# Patient Record
Sex: MALE | Race: WHITE | Employment: OTHER | URBAN - METROPOLITAN AREA
[De-identification: names, ages, dates, MRNs, and addresses within clinical notes are randomized per-mention and may not be internally consistent; named-entity substitution may affect disease eponyms.]

---

## 2021-04-26 ENCOUNTER — TRANSCRIBE ORDER (OUTPATIENT)
Dept: SCHEDULING | Age: 71
End: 2021-04-26

## 2021-04-26 DIAGNOSIS — Z72.0 TOBACCO ABUSE: Primary | ICD-10-CM

## 2021-08-02 ENCOUNTER — HOSPITAL ENCOUNTER (OUTPATIENT)
Age: 71
Setting detail: OBSERVATION
Discharge: HOME | End: 2021-08-03
Attending: INTERNAL MEDICINE | Admitting: INTERNAL MEDICINE
Payer: MEDICARE

## 2021-08-02 ENCOUNTER — APPOINTMENT (OUTPATIENT)
Dept: VASCULAR SURGERY | Age: 71
End: 2021-08-02
Attending: NURSE PRACTITIONER
Payer: MEDICARE

## 2021-08-02 ENCOUNTER — APPOINTMENT (OUTPATIENT)
Dept: GENERAL RADIOLOGY | Age: 71
End: 2021-08-02
Attending: INTERNAL MEDICINE
Payer: MEDICARE

## 2021-08-02 ENCOUNTER — APPOINTMENT (OUTPATIENT)
Dept: MRI IMAGING | Age: 71
End: 2021-08-02
Attending: NURSE PRACTITIONER
Payer: MEDICARE

## 2021-08-02 ENCOUNTER — APPOINTMENT (OUTPATIENT)
Dept: CT IMAGING | Age: 71
End: 2021-08-02
Attending: INTERNAL MEDICINE
Payer: MEDICARE

## 2021-08-02 DIAGNOSIS — R55 SYNCOPE AND COLLAPSE: ICD-10-CM

## 2021-08-02 DIAGNOSIS — S82.831A CLOSED FRACTURE OF DISTAL END OF RIGHT FIBULA, UNSPECIFIED FRACTURE MORPHOLOGY, INITIAL ENCOUNTER: Primary | ICD-10-CM

## 2021-08-02 DIAGNOSIS — I45.10 RIGHT BUNDLE BRANCH BLOCK: ICD-10-CM

## 2021-08-02 PROBLEM — S82.409A FIBULA FRACTURE: Status: ACTIVE | Noted: 2021-08-02

## 2021-08-02 PROBLEM — I10 HYPERTENSION: Status: ACTIVE | Noted: 2021-08-02

## 2021-08-02 PROBLEM — Z86.73 HISTORY OF CVA (CEREBROVASCULAR ACCIDENT): Status: ACTIVE | Noted: 2021-08-02

## 2021-08-02 LAB
ANION GAP SERPL CALC-SCNC: 10 MMOL/L
ATRIAL RATE: 67 BPM
BASOPHILS # BLD: 0 K/UL (ref 0–0.1)
BASOPHILS NFR BLD: 0 % (ref 0–2)
BUN SERPL-MCNC: 10 MG/DL (ref 9–21)
BUN/CREAT SERPL: 14
CA-I BLD-MCNC: 9.2 MG/DL (ref 8.5–10.5)
CALCULATED P AXIS, ECG09: 48 DEGREES
CALCULATED R AXIS, ECG10: 28 DEGREES
CALCULATED T AXIS, ECG11: 21 DEGREES
CHLORIDE SERPL-SCNC: 99 MMOL/L (ref 94–111)
CO2 SERPL-SCNC: 25 MMOL/L (ref 21–33)
CREAT SERPL-MCNC: 0.7 MG/DL (ref 0.8–1.5)
DIAGNOSIS, 93000: NORMAL
EOSINOPHIL # BLD: 0.2 K/UL (ref 0–0.4)
EOSINOPHIL NFR BLD: 2 % (ref 0–5)
ERYTHROCYTE [DISTWIDTH] IN BLOOD BY AUTOMATED COUNT: 14.1 % (ref 11.6–14.5)
GLUCOSE SERPL-MCNC: 110 MG/DL (ref 70–110)
HCT VFR BLD AUTO: 38 % (ref 36–48)
HGB BLD-MCNC: 12.9 G/DL (ref 13–16)
IMM GRANULOCYTES # BLD AUTO: 0 K/UL
IMM GRANULOCYTES NFR BLD AUTO: 0 %
LEFT CCA DIST DIAS: 16.8 CM/S
LEFT CCA DIST SYS: 90.7 CM/S
LEFT CCA MID DIAS: 13 CM/S
LEFT CCA MID SYS: 100 CM/S
LEFT CCA PROX DIAS: 16.1 CM/S
LEFT CCA PROX SYS: 125 CM/S
LEFT ECA SYS: 79.3 CM/S
LEFT ICA DIST DIAS: 32.9 CM/S
LEFT ICA DIST SYS: 123 CM/S
LEFT ICA MID DIAS: 26.1 CM/S
LEFT ICA MID SYS: 114 CM/S
LEFT ICA PROX DIAS: 18.6 CM/S
LEFT ICA PROX SYS: 87 CM/S
LEFT ICA/CCA SYS: 1.35
LEFT SUBCLAVIAN SYS: 139 CM/S
LEFT VERTEBRAL DIAS: 22.6 CM/S
LEFT VERTEBRAL SYS: 82.8 CM/S
LYMPHOCYTES # BLD: 1.7 K/UL (ref 0.9–3.6)
LYMPHOCYTES NFR BLD: 22 % (ref 21–52)
MAGNESIUM SERPL-MCNC: 1.9 MG/DL (ref 1.7–2.8)
MCH RBC QN AUTO: 31.5 PG (ref 24–34)
MCHC RBC AUTO-ENTMCNC: 33.9 G/DL (ref 31–37)
MCV RBC AUTO: 92.7 FL (ref 74–97)
MONOCYTES # BLD: 1 K/UL (ref 0.05–1.2)
MONOCYTES NFR BLD: 12 % (ref 3–10)
NEUTS SEG # BLD: 5 K/UL (ref 1.8–8)
NEUTS SEG NFR BLD: 64 % (ref 40–73)
P-R INTERVAL, ECG05: 195 MS
PLATELET # BLD AUTO: 189 K/UL (ref 135–420)
PMV BLD AUTO: 11 FL (ref 9.2–11.8)
POTASSIUM SERPL-SCNC: 3.5 MMOL/L (ref 3.2–5.1)
Q-T INTERVAL, ECG07: 414 MS
QRS DURATION, ECG06: 133 MS
QTC CALCULATION (BEZET), ECG08: 434 MS
RBC # BLD AUTO: 4.1 M/UL (ref 4.7–5.5)
RIGHT CCA DIST DIAS: 15.4 CM/S
RIGHT CCA DIST SYS: 93.9 CM/S
RIGHT CCA MID DIAS: 18.8 CM/S
RIGHT CCA MID SYS: 135 CM/S
RIGHT CCA PROX DIAS: 14.9 CM/S
RIGHT CCA PROX SYS: 136 CM/S
RIGHT ECA SYS: 103 CM/S
RIGHT ICA DIST DIAS: 30.8 CM/S
RIGHT ICA DIST SYS: 113 CM/S
RIGHT ICA MID DIAS: 31.5 CM/S
RIGHT ICA MID SYS: 120 CM/S
RIGHT ICA PROX DIAS: 13.3 CM/S
RIGHT ICA PROX SYS: 63.8 CM/S
RIGHT ICA/CCA SYS: 1.28
RIGHT SUBCLAVIAN SYS: 122 CM/S
RIGHT VERTEBRAL DIAS: 11.9 CM/S
RIGHT VERTEBRAL SYS: 60.9 CM/S
SODIUM SERPL-SCNC: 134 MMOL/L (ref 135–145)
VENTRICULAR RATE, ECG03: 66 BPM
WBC # BLD AUTO: 7.9 K/UL (ref 4.6–13.2)

## 2021-08-02 PROCEDURE — 93880 EXTRACRANIAL BILAT STUDY: CPT

## 2021-08-02 PROCEDURE — 99283 EMERGENCY DEPT VISIT LOW MDM: CPT

## 2021-08-02 PROCEDURE — 83735 ASSAY OF MAGNESIUM: CPT

## 2021-08-02 PROCEDURE — 71046 X-RAY EXAM CHEST 2 VIEWS: CPT

## 2021-08-02 PROCEDURE — 99218 HC RM OBSERVATION: CPT

## 2021-08-02 PROCEDURE — 74011250637 HC RX REV CODE- 250/637: Performed by: NURSE PRACTITIONER

## 2021-08-02 PROCEDURE — 74011636320 HC RX REV CODE- 636/320: Performed by: INTERNAL MEDICINE

## 2021-08-02 PROCEDURE — A9576 INJ PROHANCE MULTIPACK: HCPCS | Performed by: INTERNAL MEDICINE

## 2021-08-02 PROCEDURE — 93005 ELECTROCARDIOGRAM TRACING: CPT

## 2021-08-02 PROCEDURE — 97166 OT EVAL MOD COMPLEX 45 MIN: CPT

## 2021-08-02 PROCEDURE — 70450 CT HEAD/BRAIN W/O DYE: CPT

## 2021-08-02 PROCEDURE — 80048 BASIC METABOLIC PNL TOTAL CA: CPT

## 2021-08-02 PROCEDURE — 74011250636 HC RX REV CODE- 250/636: Performed by: NURSE PRACTITIONER

## 2021-08-02 PROCEDURE — 85025 COMPLETE CBC W/AUTO DIFF WBC: CPT

## 2021-08-02 PROCEDURE — 73610 X-RAY EXAM OF ANKLE: CPT

## 2021-08-02 PROCEDURE — 70553 MRI BRAIN STEM W/O & W/DYE: CPT

## 2021-08-02 PROCEDURE — 70549 MR ANGIOGRAPH NECK W/O&W/DYE: CPT

## 2021-08-02 RX ORDER — ENOXAPARIN SODIUM 100 MG/ML
40 INJECTION SUBCUTANEOUS EVERY 24 HOURS
Status: DISCONTINUED | OUTPATIENT
Start: 2021-08-02 | End: 2021-08-03 | Stop reason: HOSPADM

## 2021-08-02 RX ORDER — LABETALOL HCL 20 MG/4 ML
5 SYRINGE (ML) INTRAVENOUS
Status: DISCONTINUED | OUTPATIENT
Start: 2021-08-02 | End: 2021-08-03 | Stop reason: HOSPADM

## 2021-08-02 RX ORDER — SODIUM CHLORIDE 9 MG/ML
100 INJECTION, SOLUTION INTRAVENOUS CONTINUOUS
Status: DISPENSED | OUTPATIENT
Start: 2021-08-02 | End: 2021-08-03

## 2021-08-02 RX ORDER — ONDANSETRON 2 MG/ML
4 INJECTION INTRAMUSCULAR; INTRAVENOUS
Status: DISCONTINUED | OUTPATIENT
Start: 2021-08-02 | End: 2021-08-03 | Stop reason: HOSPADM

## 2021-08-02 RX ORDER — ASPIRIN 81 MG/1
81 TABLET ORAL DAILY
COMMUNITY
End: 2021-08-03

## 2021-08-02 RX ORDER — ATORVASTATIN CALCIUM 40 MG/1
80 TABLET, FILM COATED ORAL
Status: DISCONTINUED | OUTPATIENT
Start: 2021-08-02 | End: 2021-08-03 | Stop reason: HOSPADM

## 2021-08-02 RX ORDER — ASPIRIN 325 MG
325 TABLET ORAL DAILY
Status: DISCONTINUED | OUTPATIENT
Start: 2021-08-03 | End: 2021-08-03 | Stop reason: HOSPADM

## 2021-08-02 RX ORDER — HYDROCHLOROTHIAZIDE 25 MG/1
25 TABLET ORAL DAILY
Status: DISCONTINUED | OUTPATIENT
Start: 2021-08-03 | End: 2021-08-03 | Stop reason: HOSPADM

## 2021-08-02 RX ORDER — ATORVASTATIN CALCIUM 10 MG/1
TABLET, FILM COATED ORAL DAILY
Status: ON HOLD | COMMUNITY
End: 2021-08-02

## 2021-08-02 RX ORDER — ACETAMINOPHEN 325 MG/1
650 TABLET ORAL
Status: DISCONTINUED | OUTPATIENT
Start: 2021-08-02 | End: 2021-08-03 | Stop reason: HOSPADM

## 2021-08-02 RX ORDER — LISINOPRIL 10 MG/1
10 TABLET ORAL DAILY
COMMUNITY

## 2021-08-02 RX ORDER — LISINOPRIL 5 MG/1
10 TABLET ORAL DAILY
Status: DISCONTINUED | OUTPATIENT
Start: 2021-08-03 | End: 2021-08-03 | Stop reason: HOSPADM

## 2021-08-02 RX ORDER — HYDROCHLOROTHIAZIDE 25 MG/1
25 TABLET ORAL DAILY
COMMUNITY

## 2021-08-02 RX ORDER — HYDROCODONE BITARTRATE AND ACETAMINOPHEN 5; 325 MG/1; MG/1
1 TABLET ORAL
Status: DISCONTINUED | OUTPATIENT
Start: 2021-08-02 | End: 2021-08-03 | Stop reason: HOSPADM

## 2021-08-02 RX ADMIN — ATORVASTATIN CALCIUM 80 MG: 40 TABLET, FILM COATED ORAL at 21:26

## 2021-08-02 RX ADMIN — SODIUM CHLORIDE 100 ML/HR: 9 INJECTION, SOLUTION INTRAVENOUS at 21:46

## 2021-08-02 RX ADMIN — GADOTERIDOL 18 ML: 279.3 INJECTION, SOLUTION INTRAVENOUS at 15:20

## 2021-08-02 RX ADMIN — HYDROCODONE BITARTRATE AND ACETAMINOPHEN 1 TABLET: 5; 325 TABLET ORAL at 16:16

## 2021-08-02 NOTE — PROGRESS NOTES
TRANSFER - OUT REPORT:    Verbal report given to Martha RN(name) on Srikanth  being transferred to (unit) for routine progression of care       Report consisted of patients Situation, Background, Assessment and   Recommendations(SBAR). Information from the following report(s) SBAR and ED Summary was reviewed with the receiving nurse. Lines:       Opportunity for questions and clarification was provided.       Patient transported with:   Monitor  Registered Nurse

## 2021-08-02 NOTE — ASSESSMENT & PLAN NOTE
-non- weight bearing  -status post syncope and collapse  -Orthopedic consulted: Per Manny Oconnor, At that point even will repeat x-rays of the right ankle if there is no talar shift patient will be converted to a cam boot. Will remain nonweightbearing 6 weeks from the time of the injury at that point we will repeat x-rays again and if the x-rays do not show any shift then we will resume progressive weightbearing of 25% each week with the cam boot.   -PT/OT consulted  - vs SNF

## 2021-08-02 NOTE — PROGRESS NOTES
Received care of patient from emergency room no distress noted skin warm and dry no complaints of pain or discomfort voiced. Patient has a pack of cigarettes in shirt pocket and stated \"he isn't giving them up but is aware that he can't smoke in the hospital\" charge nurse made aware.

## 2021-08-02 NOTE — PROGRESS NOTES
Problem: Tobacco Use  Goal: *Tobacco use abstinence  Outcome: Progressing Towards Goal  Goal: *Knowledge of tobacco use cessation methods  Outcome: Progressing Towards Goal     Problem: Patient Education: Go to Patient Education Activity  Goal: Patient/Family Education  Outcome: Progressing Towards Goal     Problem: Falls - Risk of  Goal: *Absence of Falls  Description: Document Yamel Gamez Fall Risk and appropriate interventions in the flowsheet.   Outcome: Progressing Towards Goal  Note: Fall Risk Interventions:                                Problem: Patient Education: Go to Patient Education Activity  Goal: Patient/Family Education  Outcome: Progressing Towards Goal

## 2021-08-02 NOTE — ED PROVIDER NOTES
EMERGENCY DEPARTMENT HISTORY AND PHYSICAL EXAM      Date: 8/2/2021  Patient Name: Nessa Schrader    History of Presenting Illness     Chief Complaint   Patient presents with    Ankle Injury       History Provided By: Patient    HPI: Nessa Schrader, 78 y.o. male with a past medical history significant for stroke and HTN that presents to the ED with cc of right ankle injury. Pt states that he was sitting in the back of his truck yesterday and became dehydrated and had a syncopal episode. Wife states that the patient slumped and  fell out of the truck; foaming at the mouth and unconscious for several minutes; And he injured his right ankle. He states that he has had brief syncopal episodes since his stroke left him with right sided weakness and he occasionally gets spasms of his right leg. He denies chest pain ; sob; headache; head injury    There are no other complaints, changes, or physical findings at this time. PCP: Unknown, Provider, MD    No current facility-administered medications on file prior to encounter. Current Outpatient Medications on File Prior to Encounter   Medication Sig Dispense Refill    hydroCHLOROthiazide (MICROZIDE) 12.5 mg capsule Take 12.5 mg by mouth daily.  atorvastatin (Lipitor) 10 mg tablet Take  by mouth daily.  aspirin 81 mg chewable tablet Take 81 mg by mouth daily. Past History     Past Medical History:  Past Medical History:   Diagnosis Date    Hypertension     Stroke [facility])        Past Surgical History:  History reviewed. No pertinent surgical history. Family History:  History reviewed. No pertinent family history. Social History:  Social History     Tobacco Use    Smoking status: Current Every Day Smoker     Packs/day: 0.50    Smokeless tobacco: Never Used   Substance Use Topics    Alcohol use:  Yes     Alcohol/week: 30.0 standard drinks     Types: 30 Cans of beer per week    Drug use: Never       Allergies:  No Known Allergies      Review of Systems     Review of Systems   Constitutional: Negative for chills and fever. Respiratory: Negative for cough, chest tightness and shortness of breath. Cardiovascular: Negative for chest pain and leg swelling. Gastrointestinal: Negative for abdominal pain, diarrhea and vomiting. Genitourinary: Negative for flank pain. Musculoskeletal: Negative for back pain. Skin: Negative for wound. Neurological: Negative for headaches. Psychiatric/Behavioral: Negative for confusion. Physical Exam     Physical Exam  Vitals and nursing note reviewed. Constitutional:       Appearance: Normal appearance. HENT:      Head: Atraumatic. Mouth/Throat:      Pharynx: Oropharynx is clear. Eyes:      Pupils: Pupils are equal, round, and reactive to light. Cardiovascular:      Rate and Rhythm: Normal rate and regular rhythm. Heart sounds: Normal heart sounds. Pulmonary:      Effort: Pulmonary effort is normal.      Breath sounds: Normal breath sounds. Abdominal:      Palpations: Abdomen is soft. Tenderness: There is no abdominal tenderness. Musculoskeletal:      Cervical back: Neck supple. Comments: Swelling of the right ankle; normal pulses; no calcaneal or foot pain; no knee or calf pain; no lacs   Skin:     General: Skin is warm and dry. Capillary Refill: Capillary refill takes less than 2 seconds. Neurological:      Mental Status: He is alert and oriented to person, place, and time.    Psychiatric:         Behavior: Behavior normal.         Lab and Diagnostic Study Results     Labs -     Recent Results (from the past 12 hour(s))   EKG, 12 LEAD, INITIAL    Collection Time: 08/02/21  9:10 AM   Result Value Ref Range    Ventricular Rate 66 BPM    Atrial Rate 67 BPM    P-R Interval 195 ms    QRS Duration 133 ms    Q-T Interval 414 ms    QTC Calculation (Bezet) 434 ms    Calculated P Axis 48 degrees    Calculated R Axis 28 degrees    Calculated T Axis 21 degrees    Diagnosis Sinus rhythm  Right bundle branch block  Borderline ST elevation, lateral leads     METABOLIC PANEL, BASIC    Collection Time: 08/02/21 10:08 AM   Result Value Ref Range    Sodium 134 (L) 135 - 145 mmol/L    Potassium 3.5 3.2 - 5.1 mmol/L    Chloride 99 94 - 111 mmol/L    CO2 25 21 - 33 mmol/L    Anion gap 10 mmol/L    Glucose 110 70 - 110 mg/dL    BUN 10 9 - 21 mg/dL    Creatinine 0.70 (L) 0.8 - 1.50 mg/dL    BUN/Creatinine ratio 14      GFR est AA >60 ml/min/1.73m2    GFR est non-AA >60 ml/min/1.73m2    Calcium 9.2 8.5 - 10.5 mg/dL   CBC WITH AUTOMATED DIFF    Collection Time: 08/02/21 10:08 AM   Result Value Ref Range    WBC 7.9 4.6 - 13.2 K/uL    RBC 4.10 (L) 4.70 - 5.50 M/uL    HGB 12.9 (L) 13.0 - 16.0 g/dL    HCT 38.0 36.0 - 48.0 %    MCV 92.7 74.0 - 97.0 FL    MCH 31.5 24.0 - 34.0 PG    MCHC 33.9 31.0 - 37.0 g/dL    RDW 14.1 11.6 - 14.5 %    PLATELET 373 642 - 232 K/uL    MPV 11.0 9.2 - 11.8 FL    NEUTROPHILS 64 40 - 73 %    LYMPHOCYTES 22 21 - 52 %    MONOCYTES 12 (H) 3 - 10 %    EOSINOPHILS 2 0 - 5 %    BASOPHILS 0 0 - 2 %    IMMATURE GRANULOCYTES 0 %    ABS. NEUTROPHILS 5.0 1.8 - 8.0 K/UL    ABS. LYMPHOCYTES 1.7 0.9 - 3.6 K/UL    ABS. MONOCYTES 1.0 0.05 - 1.2 K/UL    ABS. EOSINOPHILS 0.2 0.0 - 0.4 K/UL    ABS. BASOPHILS 0.0 0.0 - 0.1 K/UL    ABS. IMM. GRANS. 0.0 K/UL   MAGNESIUM    Collection Time: 08/02/21 10:08 AM   Result Value Ref Range    Magnesium 1.9 1.7 - 2.8 mg/dL       Radiologic Studies -   @lastxrresult@  CT Results  (Last 48 hours)               08/02/21 0957  CT HEAD WO CONT Final result    Impression:      1. Age-indeterminate bilateral basal ganglia, right centrum semiovale and   bilateral thalamic lacunar infarcts. Correlation with location of clinical   findings could best evaluate significance/chronicity. 2. White matter findings non-specific but typically reflecting chronic ischemic   change in a patient of this age. Parenchymal volume loss/atrophy.        3. Sinus inflammatory disease. Vascular calcification. Please note that head CT may be negative in the acute setting and MRI could best   further evaluate for acute/subacute ischemia/infarct in the high/persistent   index of clinical suspicion. Narrative:  EXAM: CT head       HISTORY:    -Provided with order: syncope   -Additional: None       COMPARISON: None. TECHNIQUE: Axial CT imaging of the head was performed without intravenous   contrast. Sagittal and coronal reconstructions were created from the axial data. One or more dose reduction techniques were used on this CT: automated exposure   control, adjustment of the mAs and/or kVp according to patient size, and   iterative reconstruction techniques. The specific techniques used on this CT   exam have been documented in the patient's electronic medical record. Digital Imaging and Communications in Medicine (DICOM) format image data are   available to nonaffiliated external healthcare facilities or entities on a   secure, media free, reciprocally searchable basis with patient authorization for   at least a 12-month period after this study. _______________       FINDINGS:           BRAIN, POSTERIOR FOSSA, EXTRA-AXIAL SPACES AND MENINGES:    The sulci, folia, ventricles and basal cisterns are   enlarged. Patchy periventricular, subcortical and deep white matter hypodensities. Ill-defined hypodensity in the bilateral basal ganglia and punctate   hypodensities in the left basal ganglia and bilateral thalamus. Additional   hypodensity in the right centrum semiovale probably   There is no intracranial hemorrhage, mass effect, or midline shift. There are no abnormal extra-axial fluid collections. CALVARIUM: Intact. SINUSES/MASTOIDS: Sphenoethmoid sinus mucosal thickening. OTHER: Dense vascular calcification in the right vertebral artery calcification   in bilateral intracranial ICAs.         _______________               CXR Results  (Last 48 hours)               08/02/21 0948  XR CHEST PA LAT Final result    Impression:      1. Slight peribronchial accentuation/thickening which can reflect asthma or   bronchitis. Minor streaky opacities in lung bases are more in keeping with areas   of atelectasis; subtle/early infiltrate best excluded clinically. 2. Age indeterminate T12 and L1 compression fractures. If the patient has pain   referable to this fracture and is not controlled with narcotic therapy, the   patient may be a candidate for vertebral augmentation (\"augmentation\" includes   either kyphoplasty or vertebroplasty). [facility] vertebral augmentation coordinator,   Yemi MONTAGUE, Shanice Torres, can be reached at [phone]. Narrative:  HISTORY:    -Provided with order: syncope   -Additional: None       Technique: CHEST PA AND LATERAL VIEWS       Comparison study:None. FINDINGS:       HEART AND MEDIASTINUM: Aortic vascular calcification. Cardiomediastinal   silhouette otherwise unremarkable. LUNGS AND PLEURAL SPACES: Mild peribronchial accentuation/thickening. Mild   streaky linear opacities in bilateral lung bases. No plain film evidence of   consolidation, congestive heart failure, pleural effusion or pneumothorax. There   is mild elevation of left diaphragm. BONY THORAX AND SOFT TISSUES: Multilevel spondylosis with anterior wedging of   T12/L1, age indeterminate. Medical Decision Making   - I am the first provider for this patient. - I reviewed the vital signs, available nursing notes, past medical history, past surgical history, family history and social history. - Initial assessment performed. The patients presenting problems have been discussed, and they are in agreement with the care plan formulated and outlined with them. I have encouraged them to ask questions as they arise throughout their visit. Vital Signs-Reviewed the patient's vital signs.   Patient Vitals for the past 12 hrs:   Temp Pulse Resp BP SpO2   08/02/21 0859 98.1 °F (36.7 °C) 75 18 (!) 142/69 97 %       Records Reviewed: Nursing Notes  EKG 0910: nsr 66; normal NH;  [RBBB]; axis normal; QTc 434. IMP: RBBB; no acute ST changes. ED Course:     Provider Notes (Medical Decision Making):   Pt presents with syncopal episode that caused him to fall off of his truck and injure his ankle. Does not seem to be concerned about the syncopal episode because he says that he has had these in the past perhaps related to his stroke. 11:30 AM  Case discussed with NP Pancho who kindly accepted this pt in admission for syncope. Will need ortho consult for this ankle; will splint. Procedures   M    Disposition   Disposition: Admit        Diagnosis     Clinical Impression:   1. Syncope and collapse    2. Closed fracture of distal end of right fibula, unspecified fracture morphology, initial encounter    3. Right bundle branch block        Attestations:    Erline Ganser, MD    Please note that this dictation was completed with [employer] the [employer] voice recognition software. Quite often unanticipated grammatical, syntax, homophones, and other interpretive errors are inadvertently transcribed by the computer software. Please disregard these errors. Please excuse any errors that have escaped final proofreading. Thank you.

## 2021-08-02 NOTE — ASSESSMENT & PLAN NOTE
-start IV hydration  -EKG/ continuous telemetry monitoring   -check orthostatic BP  -I&O  -ECHO: ordered  -Carotid Duplex:Less than 50% stenosis in bilateral carotid arteries (mild plaque in left common carotid artery and in bilateral internal carotid arteries that are less than 50% stenosed; intimal thickening in bilateral common carotid arteries).    -CT head: Age-indeterminate bilateral basal ganglia, right centrum semiovale and  bilateral thalamic lacunar infarcts.   -discontinue offending agents  -in elderly PT/OT consults

## 2021-08-02 NOTE — PROGRESS NOTES
Admission Medication Reconciliation:    Information obtained from:  patient    Comments/Recommendations: Reviewed PTA medications and patient's allergies. Patient has nkda    Updates: Added lisinopril 10mg po daily  Changed HCTZ to 25mg po daily from 12.5mg po daily  Patient does not take Atorvastatin he takes Livalo 2mg po daily   Confirmed ASA 81mg EC daily         Allergies:  Patient has no known allergies. Significant PMH/Disease States:   Past Medical History:   Diagnosis Date    Hypertension     Stroke [facility])      Chief Complaint for this Admission:    Chief Complaint   Patient presents with    Ankle Injury     Prior to Admission Medications:   Prior to Admission Medications   Prescriptions Last Dose Informant Patient Reported? Taking?   aspirin delayed-release 81 mg tablet 8/1/2021 at Unknown time  Yes Yes   Sig: Take 81 mg by mouth daily. hydroCHLOROthiazide (HYDRODIURIL) 25 mg tablet 8/1/2021 at Unknown time  Yes Yes   Sig: Take 25 mg by mouth daily. lisinopriL (PRINIVIL, ZESTRIL) 10 mg tablet 8/1/2021 at Unknown time  Yes Yes   Sig: Take 10 mg by mouth daily. pitavastatin calcium (Livalo) 2 mg tablet 8/1/2021 at Unknown time  Yes Yes   Sig: Take 2 mg by mouth daily.       Facility-Administered Medications: None       MACKENZIE Maurice

## 2021-08-02 NOTE — CONSULTS
Weight Loss Metrics 8/2/2021   Today's Wt 190 lb   BMI 25.77 kg/m2       Body mass index is 25.77 kg/m². Past Medical History:   Diagnosis Date    Hypertension     Stroke [facility])        History reviewed. No pertinent surgical history. Current Facility-Administered Medications   Medication Dose Route Frequency Provider Last Rate Last Admin    0.9% sodium chloride infusion  100 mL/hr IntraVENous CONTINUOUS Jaja Deleon ACNP        [START ON 8/3/2021] aspirin tablet 325 mg  325 mg Oral DAILY Jaja Deleon ACNP        atorvastatin (LIPITOR) tablet 80 mg  80 mg Oral QHS Jaja Deleon ACNP        acetaminophen (TYLENOL) tablet 650 mg  650 mg Oral Q4H PRN Jaja Deleon ACNP        labetaloL (NORMODYNE;TRANDATE) 20 mg/4 mL (5 mg/mL) injection 5 mg  5 mg IntraVENous Q10MIN PRN Jaja Deleon ACNP        enoxaparin (LOVENOX) injection 40 mg  40 mg SubCUTAneous Q24H Stephanie Deleon ACNP             No Known Allergies    Social History     Tobacco Use    Smoking status: Current Every Day Smoker     Packs/day: 0.50    Smokeless tobacco: Never Used   Substance Use Topics    Alcohol use: Yes     Alcohol/week: 30.0 standard drinks     Types: 30 Cans of beer per week    Drug use: Never       History reviewed. No pertinent family history. ROS      Visit Vitals  BP (!) 142/69   Pulse 75   Temp 98.1 °F (36.7 °C)   Resp 18   Ht 6' (1.829 m)   Wt 190 lb (86.2 kg)   SpO2 97%   BMI 25.77 kg/m²     Patient is alert oriented awake in no acute distress status post syncopal episode fell and had right ankle pain was brought to emergency room and subsequently admitted to the floor for syncope work-up. X-rays are positive for distal fibula fracture nondisplaced with no evidence of any talar shift on AP lateral and mortise radiographs.     Physical examination of the left lower extremity patient has good cap refill grossly neuro intact no swelling no pain no point tenderness excellent strength and excellent range of motion and no skin lesions. Right lower extremity examination is positive for moderate mild swelling, well-padded splint, decreased range of motion, decreased sense. Positive point tenderness. No skin lesions. Impression right ankle distal fibula fracture nondisplaced with no talar shift. Plan: Plan will be for strict nonweightbearing, recommend DVT prophylaxis per hospitalist, follow-up with my nurse practitioner Lashaun Aleman 1 week post discharge. At that point even will repeat x-rays of the right ankle if there is no talar shift patient will be converted to a cam boot. Will remain nonweightbearing 6 weeks from the time of the injury at that point we will repeat x-rays again and if the x-rays do not show any shift then we will resume progressive weightbearing of 25% each week with the cam boot. He should be able to come off the cam boot approximately 2-1/2 months from the time of the injury. Continue DVT prophylaxis until patient is completely immobile.

## 2021-08-02 NOTE — H&P
History and Physical    Subjective:     Felipe Pond is a 78 y.o.  male with a past medical history of hypertension, hypercholesteremia, and CVA patient presents to the ED with a chief complaint of right ankle pain. According to wife who is at the bedside, yesterday evening patient was sitting on the back of a pickup truck and all of a sudden slumped over and fell out the back of the pickup truck and remained unconscious for 3 minutes. At that time EMS was called and examined the patient and patient was referred to come to the ED, but patient refused to be transported. This morning when the patient went to get up and out of the bed he was unable to bear weight on that right leg due to pain, at that moment the patient decided to come to the ED to be checked out. Patient denies any symptoms of shortness of breath, headaches, chest pain, palpitations, lightheadedness prior to syncope episode on previous day, EKG sinus rhythm with right bundle branch block borderline ST elevation,    In the ED CBC unremarkable, sodium 134, CT of the head showing age-indeterminate bilateral basal ganglia right centrum semiovale and bilateral thalamic lacunar infarcts, this x-raySlight peribronchial accentuation/thickening which can reflect asthma or  bronchitis. Minor streaky opacities in lung bases are more in keeping with areas of atelectasis and Age indeterminate T12 and L1 compression fractures. If the patient has pain referable to this fracture and is not controlled with narcotic therapy, bilateral carotid duplex less than 50% stenosis in bilateral carotid arteries mild plaque in the left common artery and in the bilateral internal carotid arteries. RI is ordered and repeat right ankle x-ray is ordered by Dr. Danielle Cowan. Admit to telemetry. Patient assessed in the ED, at the bedside, patient is alert and oriented, there is no acute distress noted.  Patient agrees to admission for a diagnosis of syncope and collapse and right ankle fracture, treatment to include IV hydration, MRI of brain, orthostatic blood pressure, orthopedic surgeon consult, and PT consult. Past Medical History:   Diagnosis Date    Hypertension     Stroke [facility])       History reviewed. No pertinent surgical history. History reviewed. No pertinent family history. Social History     Tobacco Use    Smoking status: Current Every Day Smoker     Packs/day: 0.50    Smokeless tobacco: Never Used   Substance Use Topics    Alcohol use: Yes     Alcohol/week: 30.0 standard drinks     Types: 30 Cans of beer per week       Prior to Admission medications    Medication Sig Start Date End Date Taking? Authorizing Provider   hydroCHLOROthiazide (HYDRODIURIL) 25 mg tablet Take 25 mg by mouth daily. Yes Other, MD Kathy   aspirin delayed-release 81 mg tablet Take 81 mg by mouth daily. Yes Other, MD Kathy   pitavastatin calcium (Livalo) 2 mg tablet Take 2 mg by mouth daily. Yes Provider, Historical   lisinopriL (PRINIVIL, ZESTRIL) 10 mg tablet Take 10 mg by mouth daily.    Yes Provider, Historical     No Known Allergies       REVIEW OF SYSTEMS:       Total of 12 systems reviewed as follows:    Positive = Red  Constitutional: Negative for malaise/fatigue and weakness, negative for fever and chills, syncope and collapse   HENT: Negative for ear pain, headaches, negative for loss of sense of taste and smell   Eyes: Negative for blurred vision and double vision   Skin: Negative for itching, negative for open areas   Cardiovascular: Negative for chest pain, palpitations, negative for swelling   Respiratory: Negative for shortness or breath, negative for cough, negative for sputum production   Gastrointestinal: Negative for abdominal pain, constipation, nausea, vomiting, and diarrhea   Genitourinary: Negative for dysuria, frequency, and hematuria   Musculoskeletal: Negative for joint pain and myalgias, right ankle pain   Neurological: Negative for dizziness, seizures, and headaches   Psychiatric: Negative for depression and anxiousness        Objective:   VITALS:    Visit Vitals  BP (!) 143/65 (BP Patient Position: Lying)   Pulse 71   Temp 97.4 °F (36.3 °C)   Resp 18   Ht 6' (1.829 m)   Wt 86.2 kg (190 lb)   SpO2 98%   BMI 25.77 kg/m²       PHYSICAL EXAM:  Positive = Red  Constitutional: Alert and oriented x 3 and no noted acute distress appears to be stated age. HENT: Atraumatic, nose midline, oropharynx clear ad moist, trachea midline, no supraclavicular   Eyes: Conjunctiva normal and pupils equal   Skin: Dry, intact, warm, and dry   Cardiovascular: Regular rate and rhythm, normal heart sounds, no murmurs, pulses palpable, no noted edema   Respiratory: Lungs clear throughout, no wheezes, rales, or rhonchi, effort normal   Gastrointestinal: Appearance normal, bowel sounds are normal, bowl soft and non-tender   Genitourinary: Deferred   Musculoskeletal: Normal ROM   Neurological: Alert and oriented x 3, awake. No facial droop. No slurred speech. Hand grasps equal, noted swelling to right ankle.   intact sensations   Psychiatric: Affect normal, Answers questions appropriately     __________________________________________________  Care Plan discussed with:    Comments   Patient X    Family      RN     Care Manager                    Consultant:      _______________________________________________________________________  Expected  Disposition:   Home with Family X   HH/PT/OT/RN    SNF/LTC    CÉSAR    ________________________________________________________________________    Labs:  Recent Results (from the past 24 hour(s))   EKG, 12 LEAD, INITIAL    Collection Time: 08/02/21  9:10 AM   Result Value Ref Range    Ventricular Rate 66 BPM    Atrial Rate 67 BPM    P-R Interval 195 ms    QRS Duration 133 ms    Q-T Interval 414 ms    QTC Calculation (Bezet) 434 ms    Calculated P Axis 48 degrees    Calculated R Axis 28 degrees    Calculated T Axis 21 degrees    Diagnosis       Sinus rhythm  Right bundle branch block  Borderline ST elevation, lateral leads     METABOLIC PANEL, BASIC    Collection Time: 08/02/21 10:08 AM   Result Value Ref Range    Sodium 134 (L) 135 - 145 mmol/L    Potassium 3.5 3.2 - 5.1 mmol/L    Chloride 99 94 - 111 mmol/L    CO2 25 21 - 33 mmol/L    Anion gap 10 mmol/L    Glucose 110 70 - 110 mg/dL    BUN 10 9 - 21 mg/dL    Creatinine 0.70 (L) 0.8 - 1.50 mg/dL    BUN/Creatinine ratio 14      GFR est AA >60 ml/min/1.73m2    GFR est non-AA >60 ml/min/1.73m2    Calcium 9.2 8.5 - 10.5 mg/dL   CBC WITH AUTOMATED DIFF    Collection Time: 08/02/21 10:08 AM   Result Value Ref Range    WBC 7.9 4.6 - 13.2 K/uL    RBC 4.10 (L) 4.70 - 5.50 M/uL    HGB 12.9 (L) 13.0 - 16.0 g/dL    HCT 38.0 36.0 - 48.0 %    MCV 92.7 74.0 - 97.0 FL    MCH 31.5 24.0 - 34.0 PG    MCHC 33.9 31.0 - 37.0 g/dL    RDW 14.1 11.6 - 14.5 %    PLATELET 188 409 - 590 K/uL    MPV 11.0 9.2 - 11.8 FL    NEUTROPHILS 64 40 - 73 %    LYMPHOCYTES 22 21 - 52 %    MONOCYTES 12 (H) 3 - 10 %    EOSINOPHILS 2 0 - 5 %    BASOPHILS 0 0 - 2 %    IMMATURE GRANULOCYTES 0 %    ABS. NEUTROPHILS 5.0 1.8 - 8.0 K/UL    ABS. LYMPHOCYTES 1.7 0.9 - 3.6 K/UL    ABS. MONOCYTES 1.0 0.05 - 1.2 K/UL    ABS. EOSINOPHILS 0.2 0.0 - 0.4 K/UL    ABS. BASOPHILS 0.0 0.0 - 0.1 K/UL    ABS. IMM.  GRANS. 0.0 K/UL   MAGNESIUM    Collection Time: 08/02/21 10:08 AM   Result Value Ref Range    Magnesium 1.9 1.7 - 2.8 mg/dL   DUPLEX CAROTID BILATERAL    Collection Time: 08/02/21  1:42 PM   Result Value Ref Range    Left CCA dist sys 90.7 cm/s    Left CCA dist mo 16.8 cm/s    LEFT COMMON CAROTID ARTERY MID S 100.00 cm/s    LEFT COMMON CAROTID ARTERY MID D 13.00 cm/s    Left CCA prox sys 125.0 cm/s    Left CCA prox mo 16.1 cm/s    Left ICA dist sys 123.0 cm/s    Left ICA dist mo 32.9 cm/s    Left ICA mid sys 114.0 cm/s    Left ICA mid mo 26.1 cm/s    Left ICA prox sys 87.0 cm/s    Left ICA prox mo 18.6 cm/s    Left subclavian sys 139.0 cm/s    Left ECA sys 79.3 cm/s    Left vertebral sys 82.8 cm/s    LEFT VERTEBRAL ARTERY D 22.60 cm/s    Right cca dist sys 93.9 cm/s    Right CCA dist mo 15.4 cm/s    RIGHT COMMON CAROTID ARTERY MID S 135.00 cm/s    RIGHT COMMON CAROTID ARTERY MID D 18.80 cm/s    Right CCA prox sys 136.0 cm/s    Right CCA prox mo 14.9 cm/s    Right ICA dist sys 113.0 cm/s    Right ICA dist mo 30.8 cm/s    Right ICA mid sys 120.0 cm/s    Right ICA mid mo 31.5 cm/s    Right ICA prox sys 63.8 cm/s    Right ICA prox mo 13.3 cm/s    Right subclavian sys 122.0 cm/s    Right eca sys 103.0 cm/s    Right vertebral sys 60.9 cm/s    RIGHT VERTEBRAL ARTERY D 11.90 cm/s    Right ICA/CCA sys 1.28     Left ICA/CCA sys 1.35        Imaging:  XR CHEST PA LAT    Result Date: 8/2/2021  HISTORY: -Provided with order: syncope -Additional: None Technique: CHEST PA AND LATERAL VIEWS Comparison study:None. FINDINGS: HEART AND MEDIASTINUM: Aortic vascular calcification. Cardiomediastinal silhouette otherwise unremarkable. LUNGS AND PLEURAL SPACES: Mild peribronchial accentuation/thickening. Mild streaky linear opacities in bilateral lung bases. No plain film evidence of consolidation, congestive heart failure, pleural effusion or pneumothorax. There is mild elevation of left diaphragm. BONY THORAX AND SOFT TISSUES: Multilevel spondylosis with anterior wedging of T12/L1, age indeterminate. 1. Slight peribronchial accentuation/thickening which can reflect asthma or bronchitis. Minor streaky opacities in lung bases are more in keeping with areas of atelectasis; subtle/early infiltrate best excluded clinically. 2. Age indeterminate T12 and L1 compression fractures. If the patient has pain referable to this fracture and is not controlled with narcotic therapy, the patient may be a candidate for vertebral augmentation (\"augmentation\" includes either kyphoplasty or vertebroplasty).   [facility] vertebral augmentation coordinator, RA Joaquim MONTAGUE, can be reached at [phone]. XR ANKLE RT MIN 3 V    Result Date: 8/2/2021  HISTORY: -From Provider: fall off of truck yesterday -Additional: None Technique: Three views of the right ankle are obtained. Comparison study: none. Findings: There is bimalleolar soft tissue swelling. There is an oblique trans-syndesmotic fracture of the right distal fibula, with estimated 0.4 cm of lateral disc lay segment of the distal fracture fragment. Additional irregular densities project at the anterior talar crural margin and inferior to the medial malleolus, in keeping with age indeterminate osseous fragment/fracture. There is vascular calcification. There is superior and inferior calcaneal enthesopathy. 1. Slightly displaced, trans-syndesmotic right distal fibular fracture. Bimalleolar soft tissue swelling. 2..  Foci of ossific density at the inferior tip of the medial malleolus and anterior tibia at the talocrural joint are in keeping with age indeterminate trauma, but may be acute in the setting of focal clinical tenderness. 3. Vascular calcification which can be seen with diabetes. CT HEAD WO CONT    Result Date: 8/2/2021  EXAM: CT head HISTORY: -Provided with order: syncope -Additional: None COMPARISON: None. TECHNIQUE: Axial CT imaging of the head was performed without intravenous contrast. Sagittal and coronal reconstructions were created from the axial data. One or more dose reduction techniques were used on this CT: automated exposure control, adjustment of the mAs and/or kVp according to patient size, and iterative reconstruction techniques. The specific techniques used on this CT exam have been documented in the patient's electronic medical record. Digital Imaging and Communications in Medicine (DICOM) format image data are available to nonaffiliated external healthcare facilities or entities on a secure, media free, reciprocally searchable basis with patient authorization for at least a 12-month period after this study. _______________ FINDINGS: BRAIN, POSTERIOR FOSSA, EXTRA-AXIAL SPACES AND MENINGES:  The sulci, folia, ventricles and basal cisterns are   enlarged. Patchy periventricular, subcortical and deep white matter hypodensities. Ill-defined hypodensity in the bilateral basal ganglia and punctate hypodensities in the left basal ganglia and bilateral thalamus. Additional hypodensity in the right centrum semiovale probably There is no intracranial hemorrhage, mass effect, or midline shift. There are no abnormal extra-axial fluid collections. CALVARIUM: Intact. SINUSES/MASTOIDS: Sphenoethmoid sinus mucosal thickening. OTHER: Dense vascular calcification in the right vertebral artery calcification in bilateral intracranial ICAs. _______________     1. Age-indeterminate bilateral basal ganglia, right centrum semiovale and bilateral thalamic lacunar infarcts. Correlation with location of clinical findings could best evaluate significance/chronicity. 2. White matter findings non-specific but typically reflecting chronic ischemic change in a patient of this age. Parenchymal volume loss/atrophy. 3. Sinus inflammatory disease. Vascular calcification. Please note that head CT may be negative in the acute setting and MRI could best further evaluate for acute/subacute ischemia/infarct in the high/persistent index of clinical suspicion. DUPLEX CAROTID BILATERAL    Result Date: 8/2/2021  · Less than 50% stenosis in bilateral carotid arteries (mild plaque in left common carotid artery and in bilateral internal carotid arteries that are less than 50% stenosed; intimal thickening in bilateral common carotid arteries). Assessment & Plan:     Fibula fracture  -non- weight bearing  -status post syncope and collapse  -Orthopedic consulted: Per Scarlett Hernandez, At that point even will repeat x-rays of the right ankle if there is no talar shift patient will be converted to a cam boot.   Will remain nonweightbearing 6 weeks from the time of the injury at that point we will repeat x-rays again and if the x-rays do not show any shift then we will resume progressive weightbearing of 25% each week with the cam boot. -PT/OT consulted  -pain mgt    Syncope  -start IV hydration  -EKG/ continuous telemetry monitoring   -check orthostatic BP  -I&O  -ECHO: ordered  -Carotid Duplex:Less than 50% stenosis in bilateral carotid arteries (mild plaque in left common carotid artery and in bilateral internal carotid arteries that are less than 50% stenosed; intimal thickening in bilateral common carotid arteries). -CT head: Age-indeterminate bilateral basal ganglia, right centrum semiovale and bilateral thalamic lacunar infarcts. -MRI and MRA brain and neck  -discontinue offending agents  -in elderly PT/OT consults     History of CVA (cerebrovascular accident)  -continue ASA and Statin started    Hypertension  -chronic/mildy controlled  -continue HCTZ and Lisinopril  -monitor BP closely    Tobacco Abuse  -smoke a pack of cigarette daily  -refused Nicotine patch  -tobacco cessation education provided    Alcohol Abuse  -drinks 4 cans of beer daily  -monitor CIWA    TOTAL TIME:  45 Minutes    Code Status: Full    Prophylaxis:  Lovenox    Electronically Signed : Latia Kwan [facility] [address]    Please note that this dictation was completed with [employer] the [employer] voice recognition software. Quite often unanticipated grammatical, syntax, homophones, and other interpretive errors are inadvertently transcribed by the computer software. Please disregard these errors. Please excuse any errors that have escaped final proofreading. Thank you.

## 2021-08-02 NOTE — ED TRIAGE NOTES
Pt states he passed out last night and when he passed out he fell and hurt his ankle. Pt states R ankle \"feels like its broke. \" Pt wife states she called ems when he passed out but pt refused transport to hospital stating he felt fine.

## 2021-08-03 ENCOUNTER — APPOINTMENT (OUTPATIENT)
Dept: NON INVASIVE DIAGNOSTICS | Age: 71
End: 2021-08-03
Attending: NURSE PRACTITIONER
Payer: MEDICARE

## 2021-08-03 VITALS
HEIGHT: 72 IN | TEMPERATURE: 97.1 F | HEART RATE: 65 BPM | DIASTOLIC BLOOD PRESSURE: 63 MMHG | OXYGEN SATURATION: 99 % | SYSTOLIC BLOOD PRESSURE: 126 MMHG | WEIGHT: 183 LBS | BODY MASS INDEX: 24.79 KG/M2 | RESPIRATION RATE: 18 BRPM

## 2021-08-03 LAB
ANION GAP SERPL CALC-SCNC: 8 MMOL/L
BUN SERPL-MCNC: 13 MG/DL (ref 9–21)
BUN/CREAT SERPL: 19
CA-I BLD-MCNC: 9 MG/DL (ref 8.5–10.5)
CHLORIDE SERPL-SCNC: 102 MMOL/L (ref 94–111)
CHOLEST SERPL-MCNC: 118 MG/DL
CO2 SERPL-SCNC: 25 MMOL/L (ref 21–33)
CREAT SERPL-MCNC: 0.7 MG/DL (ref 0.8–1.5)
ECHO AO ASC DIAM: 3.5 CM
ECHO AO ROOT DIAM: 3.3 CM
ECHO AV AREA PEAK VELOCITY: 2.98 CM2
ECHO AV AREA PLAN/BSA: 1.45
ECHO AV AREA VTI: 2.97 CM2
ECHO AV AREA/BSA PEAK VELOCITY: 1.5 CM2/M2
ECHO AV AREA/BSA VTI: 1.4 CM2/M2
ECHO AV CUSP MM: 2 CM
ECHO AV MEAN GRADIENT: 3 MMHG
ECHO AV MEAN VELOCITY: 87.8 CM/S
ECHO AV PEAK GRADIENT: 7 MMHG
ECHO AV PEAK VELOCITY: 131 CM/S
ECHO AV VTI: 27.8 CM
ECHO EST RA PRESSURE: 3 MMHG
ECHO LA AREA 2C: 16.1 CM2
ECHO LA AREA 4C: 17.5 CM2
ECHO LA MAJOR AXIS: 3.5 CM
ECHO LA MAJOR AXIS: 5.02 CM
ECHO LA TO AORTIC ROOT RATIO: 1.06
ECHO LV E' LATERAL VELOCITY: 9.25 CM/S
ECHO LV E' SEPTAL VELOCITY: 7.18 CM/S
ECHO LV EDV A2C: 79.5 CM3
ECHO LV EJECTION FRACTION BIPLANE: 64.4 % (ref 55–100)
ECHO LV ESV A2C: 22 CM3
ECHO LV INTERNAL DIMENSION DIASTOLIC: 4.3 CM (ref 4.2–5.9)
ECHO LV INTERNAL DIMENSION SYSTOLIC: 2.8 CM
ECHO LV IVSD: 0.9 CM (ref 0.6–1)
ECHO LV MASS 2D: 132.7 G (ref 88–224)
ECHO LV MASS INDEX 2D: 64.8 G/M2 (ref 49–115)
ECHO LV POSTERIOR WALL DIASTOLIC: 1 CM (ref 0.6–1)
ECHO LVOT DIAM: 2.1 CM
ECHO LVOT PEAK GRADIENT: 5 MMHG
ECHO LVOT PEAK VELOCITY: 113 CM/S
ECHO LVOT SV: 83 CM3
ECHO LVOT VTI: 22.2 CM
ECHO LVOT VTI: 23.9 CM
ECHO LVOT VTI: 24.5 CM
ECHO LVOT VTI: 25.1 CM
ECHO MV A VELOCITY: 87.3 CM/S
ECHO MV AREA PHT: 2.97 CM2
ECHO MV E DECELERATION TIME (DT): 254 MS
ECHO MV E VELOCITY: 102 CM/S
ECHO MV E/A RATIO: 1.17
ECHO MV E/E' LATERAL: 11.03
ECHO MV E/E' RATIO (AVERAGED): 12.62
ECHO MV E/E' SEPTAL: 14.21
ECHO MV MAX VELOCITY: 108 CM/S
ECHO MV MEAN GRADIENT: 1 MMHG
ECHO MV PEAK GRADIENT: 5 MMHG
ECHO MV PRESSURE HALF TIME (PHT): 74 MS
ECHO MV VTI: 37.1 CM
ECHO RA AREA 4C: 14.6 CM2
ECHO RA MAJOR AXIS: 4.91 CM
ECHO RA MINOR AXIS: 3.8 CM
ECHO RIGHT VENTRICULAR SYSTOLIC PRESSURE (RVSP): 24 MMHG
ECHO RV INTERNAL DIMENSION: 2.9 CM
ECHO RV TAPSE: 1.8 CM (ref 1.5–2)
ECHO TV MEAN GRADIENT: 21 MMHG
ECHO TV REGURGITANT MAX VELOCITY: 231 CM/S
ERYTHROCYTE [DISTWIDTH] IN BLOOD BY AUTOMATED COUNT: 14.2 % (ref 11.6–14.5)
EST. AVERAGE GLUCOSE BLD GHB EST-MCNC: 114 MG/DL
GLUCOSE SERPL-MCNC: 89 MG/DL (ref 70–110)
HBA1C MFR BLD: 5.6 % (ref 4.2–5.6)
HCT VFR BLD AUTO: 38.1 % (ref 36–48)
HDLC SERPL-MCNC: 44 MG/DL (ref 40–60)
HDLC SERPL: 2.7 {RATIO} (ref 0–5)
HGB BLD-MCNC: 12.4 G/DL (ref 13–16)
LA VOL DISK BP: 48.7 CM3 (ref 18–58)
LDLC SERPL CALC-MCNC: 59 MG/DL (ref 0–100)
LIPID PROFILE,FLP: NORMAL
LVOT MG: 3 MMHG
MCH RBC QN AUTO: 30.2 PG (ref 24–34)
MCHC RBC AUTO-ENTMCNC: 32.5 G/DL (ref 31–37)
MCV RBC AUTO: 92.7 FL (ref 74–97)
MV DEC SLOPE: 4020 MM/S2
MV DEC SLOPE: 4020 MM/S2
PLATELET # BLD AUTO: 171 K/UL (ref 135–420)
PMV BLD AUTO: 11.4 FL (ref 9.2–11.8)
POTASSIUM SERPL-SCNC: 3.9 MMOL/L (ref 3.2–5.1)
RBC # BLD AUTO: 4.11 M/UL (ref 4.7–5.5)
SODIUM SERPL-SCNC: 135 MMOL/L (ref 135–145)
TRIGL SERPL-MCNC: 75 MG/DL (ref ?–150)
VLDLC SERPL CALC-MCNC: 15 MG/DL
WBC # BLD AUTO: 7.4 K/UL (ref 4.6–13.2)

## 2021-08-03 PROCEDURE — 80048 BASIC METABOLIC PNL TOTAL CA: CPT

## 2021-08-03 PROCEDURE — 96374 THER/PROPH/DIAG INJ IV PUSH: CPT

## 2021-08-03 PROCEDURE — 74011250637 HC RX REV CODE- 250/637: Performed by: NURSE PRACTITIONER

## 2021-08-03 PROCEDURE — 80061 LIPID PANEL: CPT

## 2021-08-03 PROCEDURE — 36415 COLL VENOUS BLD VENIPUNCTURE: CPT

## 2021-08-03 PROCEDURE — 97161 PT EVAL LOW COMPLEX 20 MIN: CPT

## 2021-08-03 PROCEDURE — 83036 HEMOGLOBIN GLYCOSYLATED A1C: CPT

## 2021-08-03 PROCEDURE — 85027 COMPLETE CBC AUTOMATED: CPT

## 2021-08-03 PROCEDURE — 96372 THER/PROPH/DIAG INJ SC/IM: CPT

## 2021-08-03 PROCEDURE — 74011250636 HC RX REV CODE- 250/636: Performed by: NURSE PRACTITIONER

## 2021-08-03 PROCEDURE — 99218 HC RM OBSERVATION: CPT

## 2021-08-03 RX ORDER — HYDROCODONE BITARTRATE AND ACETAMINOPHEN 5; 325 MG/1; MG/1
1 TABLET ORAL
Qty: 20 TABLET | Refills: 0 | Status: SHIPPED | OUTPATIENT
Start: 2021-08-03 | End: 2021-08-10

## 2021-08-03 RX ORDER — ASPIRIN 325 MG
325 TABLET ORAL 2 TIMES DAILY
Qty: 60 TABLET | Refills: 0 | Status: SHIPPED | OUTPATIENT
Start: 2021-08-03 | End: 2021-08-03 | Stop reason: SDUPTHER

## 2021-08-03 RX ORDER — ASPIRIN 325 MG
325 TABLET ORAL 2 TIMES DAILY
Qty: 60 TABLET | Refills: 1 | Status: SHIPPED | OUTPATIENT
Start: 2021-08-03 | End: 2021-09-14

## 2021-08-03 RX ADMIN — ENOXAPARIN SODIUM 40 MG: 40 INJECTION SUBCUTANEOUS at 11:35

## 2021-08-03 RX ADMIN — LISINOPRIL 10 MG: 5 TABLET ORAL at 09:12

## 2021-08-03 RX ADMIN — HYDROCODONE BITARTRATE AND ACETAMINOPHEN 1 TABLET: 5; 325 TABLET ORAL at 04:52

## 2021-08-03 RX ADMIN — SODIUM CHLORIDE 100 ML/HR: 9 INJECTION, SOLUTION INTRAVENOUS at 08:03

## 2021-08-03 RX ADMIN — ASPIRIN 325 MG ORAL TABLET 325 MG: 325 PILL ORAL at 09:13

## 2021-08-03 RX ADMIN — HYDROCHLOROTHIAZIDE 25 MG: 25 TABLET ORAL at 09:12

## 2021-08-03 NOTE — PROGRESS NOTES
Patient tolerated daily scheduled medications with no problems. Denies any pain or needs at this time. Will continue to monitor.

## 2021-08-03 NOTE — DISCHARGE SUMMARY
HOSPITALIST DISCHARGE NOTE  Isabella Lara MD, [address] [facility]       PATIENT ID: Mynor Sanchez  MRN: [id number]   YOB: 1950    DATE OF ADMISSION: 8/2/2021  8:53 AM    DATE OF DISCHARGE: 08/03/21    PRIMARY CARE PROVIDER: Unknown, Provider, MD     ATTENDING PHYSICIAN: Isabella Lara MD  DISCHARGING PROVIDER: Isabella Lara MD        CONSULTATIONS: IP CONSULT TO ORTHOPEDIC SURGERY    PROCEDURES/SURGERIES: * No surgery found *    ADMITTING [address] [place] [address] COURSE:     Mynor Sanchez is a 78 y.o.  male with a past medical history of hypertension, hypercholesteremia, and CVA patient presents to the ED with a chief complaint of right ankle pain. According to wife who is at the bedside, yesterday evening patient was sitting on the back of a pickup truck and all of a sudden slumped over and fell out the back of the pickup truck and remained unconscious for 3 minutes. At that time EMS was called and examined the patient and patient was referred to come to the ED, but patient refused to be transported. This morning when the patient went to get up and out of the bed he was unable to bear weight on that right leg due to pain, at that moment the patient decided to come to the ED to be checked out. Patient denies any symptoms of shortness of breath, headaches, chest pain, palpitations, lightheadedness prior to syncope episode on previous day, EKG sinus rhythm with right bundle branch block borderline ST elevation,     In the ED CBC unremarkable, sodium 134, CT of the head showing age-indeterminate bilateral basal ganglia right centrum semiovale and bilateral thalamic lacunar infarcts, this x-raySlight peribronchial accentuation/thickening which can reflect asthma or  bronchitis.  Minor streaky opacities in lung bases are more in keeping with areas of atelectasis and Age indeterminate T12 and L1 compression fractures. If the patient has pain referable to this fracture and is not controlled with narcotic therapy, bilateral carotid duplex less than 50% stenosis in bilateral carotid arteries mild plaque in the left common artery and in the bilateral internal carotid arteries. RI is ordered and repeat right ankle x-ray is ordered by Dr. Shanae Ponce to telemetry. Patient assessed in the ED, at the bedside, patient is alert and oriented, there is no acute distress noted. Patient agrees to admission for a diagnosis of syncope and collapse and right ankle fracture, treatment to include IV hydration, MRI of brain, orthostatic blood pressure, orthopedic surgeon consult, and PT consult. DISCHARGE DIAGNOSES / PLAN:      Acute Fibula fracture  Non- weight bearing  Status post syncope and collapse  Orthopedic consulted: Per Joon Chandler, At that point even will repeat x-rays of the right ankle if there is no talar shift patient will be converted to a cam boot. Will remain nonweightbearing 6 weeks from the time of the injury at that point we will repeat x-rays again and if the x-rays do not show any shift then we will resume progressive weightbearing of 25% each week with the cam boot. Patient is able to ambulate with minimal assistance and is stable enough to be discharged home. Continue aspirin 325 mg twice daily for 6 weeks. Then taper down to aspirin 81 mg daily thereafter.     Syncope  EKG/ continuous telemetry monitoring   Echocardiogram pending result   Carotid Duplex:Less than 50% stenosis in bilateral carotid arteries (mild plaque in left common carotid artery and in bilateral internal carotid arteries that are less than 50% stenosed; intimal thickening in bilateral common carotid arteries). CT head: Age-indeterminate bilateral basal ganglia, right centrum semiovale and bilateral thalamic lacunar infarcts.    MRI of the head without any acute findings however chronic bilateral thalamic lacunar infarcts noted. Patient significant improved after IV fluids.     History of CVA (cerebrovascular accident)  continue ASA and Statin started     Hypertension  Continue HCTZ and Lisinopril     Tobacco Abuse  Refused Nicotine patch  Tobacco cessation education provided     Alcohol Abuse  Drinks 4 cans of beer daily  CIWA    PENDING TEST RESULTS:   At the time of discharge the following test results are still pending: None    FOLLOW UP APPOINTMENTS:    Follow-up Information     Follow up With Specialties Details Why Contact Info    Unknown, Provider, MD    Patient not available to ask             DIET: Cardiac Diet    ACTIVITY: Activity as tolerated; Nonweightbearing in right lower extremity x6 weeks    DISCHARGE MEDICATIONS:  Current Discharge Medication List      START taking these medications    Details   HYDROcodone-acetaminophen (NORCO) 5-325 mg per tablet Take 1 Tablet by mouth every six (6) hours as needed for Pain for up to 7 days. Max Daily Amount: 4 Tablets. Qty: 20 Tablet, Refills: 0  Start date: 8/3/2021, End date: 8/10/2021    Associated Diagnoses: Closed fracture of distal end of right fibula, unspecified fracture morphology, initial encounter      aspirin (ASPIRIN) 325 mg tablet Take 1 Tablet by mouth two (2) times a day for 42 days. Qty: 60 Tablet, Refills: 1  Start date: 8/3/2021, End date: 9/14/2021         CONTINUE these medications which have NOT CHANGED    Details   hydroCHLOROthiazide (HYDRODIURIL) 25 mg tablet Take 25 mg by mouth daily. pitavastatin calcium (Livalo) 2 mg tablet Take 2 mg by mouth daily. lisinopriL (PRINIVIL, ZESTRIL) 10 mg tablet Take 10 mg by mouth daily.          STOP taking these medications       aspirin delayed-release 81 mg tablet Comments:   Reason for Stopping:                 Recent Days:  Recent Labs     08/03/21  0429 08/02/21  1008   WBC 7.4 7.9   HGB 12.4* 12.9*   HCT 38.1 38.0    189     Recent Labs     08/03/21  0429 08/02/21  1008    134*   K 3.9 3.5    99   CO2 25 25   GLU 89 110   BUN 13 10   CREA 0.70* 0.70*   CA 9.0 9.2   MG  --  1.9     No results for input(s): PH, PCO2, PO2, HCO3, FIO2 in the last 72 hours. Recent Results (from the past 336 hour(s))   EKG, 12 LEAD, INITIAL    Collection Time: 08/02/21  9:10 AM   Result Value Ref Range    Ventricular Rate 66 BPM    Atrial Rate 67 BPM    P-R Interval 195 ms    QRS Duration 133 ms    Q-T Interval 414 ms    QTC Calculation (Bezet) 434 ms    Calculated P Axis 48 degrees    Calculated R Axis 28 degrees    Calculated T Axis 21 degrees    Diagnosis       Sinus rhythm  Right bundle branch block  Borderline ST elevation, lateral leads    Confirmed by SINGH Devi ([id number]) on 8/2/2021 1:05:37 PM     METABOLIC PANEL, BASIC    Collection Time: 08/02/21 10:08 AM   Result Value Ref Range    Sodium 134 (L) 135 - 145 mmol/L    Potassium 3.5 3.2 - 5.1 mmol/L    Chloride 99 94 - 111 mmol/L    CO2 25 21 - 33 mmol/L    Anion gap 10 mmol/L    Glucose 110 70 - 110 mg/dL    BUN 10 9 - 21 mg/dL    Creatinine 0.70 (L) 0.8 - 1.50 mg/dL    BUN/Creatinine ratio 14      GFR est AA >60 ml/min/1.73m2    GFR est non-AA >60 ml/min/1.73m2    Calcium 9.2 8.5 - 10.5 mg/dL   CBC WITH AUTOMATED DIFF    Collection Time: 08/02/21 10:08 AM   Result Value Ref Range    WBC 7.9 4.6 - 13.2 K/uL    RBC 4.10 (L) 4.70 - 5.50 M/uL    HGB 12.9 (L) 13.0 - 16.0 g/dL    HCT 38.0 36.0 - 48.0 %    MCV 92.7 74.0 - 97.0 FL    MCH 31.5 24.0 - 34.0 PG    MCHC 33.9 31.0 - 37.0 g/dL    RDW 14.1 11.6 - 14.5 %    PLATELET 614 880 - 699 K/uL    MPV 11.0 9.2 - 11.8 FL    NEUTROPHILS 64 40 - 73 %    LYMPHOCYTES 22 21 - 52 %    MONOCYTES 12 (H) 3 - 10 %    EOSINOPHILS 2 0 - 5 %    BASOPHILS 0 0 - 2 %    IMMATURE GRANULOCYTES 0 %    ABS. NEUTROPHILS 5.0 1.8 - 8.0 K/UL    ABS. LYMPHOCYTES 1.7 0.9 - 3.6 K/UL    ABS. MONOCYTES 1.0 0.05 - 1.2 K/UL    ABS. EOSINOPHILS 0.2 0.0 - 0.4 K/UL    ABS. BASOPHILS 0.0 0.0 - 0.1 K/UL    ABS. IMM.  GRANS. 0.0 K/UL   MAGNESIUM Collection Time: 08/02/21 10:08 AM   Result Value Ref Range    Magnesium 1.9 1.7 - 2.8 mg/dL   DUPLEX CAROTID BILATERAL    Collection Time: 08/02/21  1:42 PM   Result Value Ref Range    Left CCA dist sys 90.7 cm/s    Left CCA dist mo 16.8 cm/s    LEFT COMMON CAROTID ARTERY MID S 100.00 cm/s    LEFT COMMON CAROTID ARTERY MID D 13.00 cm/s    Left CCA prox sys 125.0 cm/s    Left CCA prox mo 16.1 cm/s    Left ICA dist sys 123.0 cm/s    Left ICA dist mo 32.9 cm/s    Left ICA mid sys 114.0 cm/s    Left ICA mid mo 26.1 cm/s    Left ICA prox sys 87.0 cm/s    Left ICA prox mo 18.6 cm/s    Left subclavian sys 139.0 cm/s    Left ECA sys 79.3 cm/s    Left vertebral sys 82.8 cm/s    LEFT VERTEBRAL ARTERY D 22.60 cm/s    Right cca dist sys 93.9 cm/s    Right CCA dist mo 15.4 cm/s    RIGHT COMMON CAROTID ARTERY MID S 135.00 cm/s    RIGHT COMMON CAROTID ARTERY MID D 18.80 cm/s    Right CCA prox sys 136.0 cm/s    Right CCA prox mo 14.9 cm/s    Right ICA dist sys 113.0 cm/s    Right ICA dist mo 30.8 cm/s    Right ICA mid sys 120.0 cm/s    Right ICA mid mo 31.5 cm/s    Right ICA prox sys 63.8 cm/s    Right ICA prox mo 13.3 cm/s    Right subclavian sys 122.0 cm/s    Right eca sys 103.0 cm/s    Right vertebral sys 60.9 cm/s    RIGHT VERTEBRAL ARTERY D 11.90 cm/s    Right ICA/CCA sys 1.28     Left ICA/CCA sys 1.35    LIPID PANEL    Collection Time: 08/03/21  4:29 AM   Result Value Ref Range    LIPID PROFILE        Cholesterol, total 118 <200 mg/dL    Triglyceride 75 <150 mg/dL    HDL Cholesterol 44 40 - 60 mg/dL    LDL, calculated 59 0 - 100 mg/dL    VLDL, calculated 15 mg/dL    CHOL/HDL Ratio 2.7 0 - 5.0     HEMOGLOBIN A1C WITH EAG    Collection Time: 08/03/21  4:29 AM   Result Value Ref Range    Hemoglobin A1c 5.6 4.2 - 5.6 %    Est. average glucose 114 mg/dL   CBC W/O DIFF    Collection Time: 08/03/21  4:29 AM   Result Value Ref Range    WBC 7.4 4.6 - 13.2 K/uL    RBC 4.11 (L) 4.70 - 5.50 M/uL    HGB 12.4 (L) 13.0 - 16.0 g/dL    HCT 38.1 36.0 - 48.0 %    MCV 92.7 74.0 - 97.0 FL    MCH 30.2 24.0 - 34.0 PG    MCHC 32.5 31.0 - 37.0 g/dL    RDW 14.2 11.6 - 14.5 %    PLATELET 096 867 - 788 K/uL    MPV 11.4 9.2 - 91.7 FL   METABOLIC PANEL, BASIC    Collection Time: 08/03/21  4:29 AM   Result Value Ref Range    Sodium 135 135 - 145 mmol/L    Potassium 3.9 3.2 - 5.1 mmol/L    Chloride 102 94 - 111 mmol/L    CO2 25 21 - 33 mmol/L    Anion gap 8 mmol/L    Glucose 89 70 - 110 mg/dL    BUN 13 9 - 21 mg/dL    Creatinine 0.70 (L) 0.8 - 1.50 mg/dL    BUN/Creatinine ratio 19      GFR est AA >60 ml/min/1.73m2    GFR est non-AA >60 ml/min/1.73m2    Calcium 9.0 8.5 - 10.5 mg/dL   ECHO ADULT COMPLETE    Collection Time: 08/03/21  8:21 AM   Result Value Ref Range    Left Atrium Major [place] 5.02 cm    LA Area 2C 16.10 cm2    LA Area 4C 17.50 cm2    LA Volume DISK BP 48.70 18.0 - 58.0 cm3    Left Atrium Major Axis 3.50 cm    Left Atrium to Aortic Root Ratio 1.06     Right Atrial Area 4C 14.60 cm2    Right Atrium Major Axis 4.91 cm    Est. RA Pressure 3.00 mmHg    Right Atrium Minor Axis 3.80 cm    AV Cusp 2.00 cm    Aortic Valve Systolic Mean Gradient 2.05 mmHg    AoV VTI 27.80 cm    Aortic valve mean velocity 87.80 cm/s    Aortic Valve Systolic Peak Velocity 972.73 cm/s    AoV PG 7.00 mmHg    Aortic Valve Area by Continuity of VTI 2.97 cm2    Aortic Valve Area by Continuity of Peak Velocity 2.98 cm2    VALERIY/BSA 1.45     Ao Root D 3.30 cm    AO ASC D 3.50 cm    IVSd 0.90 0.60 - 1.00 cm    LVIDd 4.30 4.20 - 5.90 cm    LVIDs 2.80 cm    LVOT d 2.10 cm    Left Ventricular Outflow Tract Mean Gradient 3.00 mmHg    LVOT VTI 23.90 cm    LVOT VTI 22.20 cm    LVOT VTI 24.50 cm    LVOT VTI 25.10 cm    LVOT Peak Velocity 113.00 cm/s    LVOT Peak Gradient 5.00 mmHg    LVPWd 1.00 0.60 - 1.00 cm    LV E' Lateral Velocity 9.25 cm/s    LV E' Septal Velocity 7.18 cm/s    LV ED Vol A2C 79.50 cm3    LV ES Vol A2C 22.00 cm3    E/E' lateral 11.03     E/E' septal 14.21     LVOT SV 83.0 cm3    Mitral Valve Deceleration [place] 4,020.00 mm/s2    Mitral Valve Deceleration [place] 4,020.00 mm/s2    Mitral Valve E Wave Deceleration Time 254.00 ms    Mitral Valve Pressure Half-time 74.00 ms    MV A Arturo 87.30 cm/s    MV E Arturo 102.00 cm/s    MV Mean Gradient 1.00 mmHg    Mitral Valve Annulus Velocity Time Integral 37.10 cm    Mitral Valve Max Velocity 108.00 cm/s    MV Peak Gradient 5.00 mmHg    MVA (PHT) 2.97 cm2    MV E/A 1.17     RVIDd 2.90 cm    RVSP 24.00 mmHg    Tapse 1.80 1.50 - 2.00 cm    TR Max Velocity 231.00 cm/s    TV MG 21.00 mmHg    BP EF 64.4 55.0 - 100.0 %    LV Mass .7 88.0 - 224.0 g    LV Mass AL Index 64.8 49.0 - 115.0 g/m2    E/E' ratio (averaged) 12.62     VALERIY/BSA Pk Arturo 1.5 cm2/m2    VALERIY/BSA VTI 1.4 cm2/m2        NOTIFY YOUR PHYSICIAN FOR ANY OF THE FOLLOWING:   Fever over 101 degrees for 24 hours. Chest pain, shortness of breath, fever, chills, nausea, vomiting, diarrhea, change in mentation, falling, weakness, bleeding. Severe pain or pain not relieved by medications. Or, any other signs or symptoms that you may have questions about. DISPOSITION:   x Home With:   OT  PT  HH  RN       Long term SNF/Inpatient Rehab    Independent/assisted living    Hospice    Other:       PATIENT CONDITION AT DISCHARGE:     Functional status    Poor    x Deconditioned  : Nonweightbearing on right lower extremity for 4 to 6 weeks    Independent      Cognition    x Lucid     Forgetful     Dementia      Catheters/lines (plus indication)    Souza     PICC     PEG    x None      Code status    x Full code     DNR      PHYSICAL EXAMINATION AT DISCHARGE:  General:          Alert, cooperative, no distress, appears stated age. Neck:               Supple, symmetrical  Lungs:             Clear to auscultation bilaterally. No Wheezing or Rhonchi. No rales. Chest wall:      No tenderness  No Accessory muscle use.   Heart:              Regular  rhythm,  No  murmur   No edema  Abdomen:        Soft, non-tender. Not distended. Bowel sounds normal  Extremities:     No cyanosis. No clubbing,                            Skin turgor normal, Capillary refill normal  Skin:                Not pale. Not Jaundiced  No rashes   Psych:             Not anxious or agitated.   Neurologic:      Alert, moves all extremities, answers questions appropriately and responds to commands       CHRONIC MEDICAL DIAGNOSES:  Problem List as of 8/3/2021 Never Reviewed        Codes Class Noted - Resolved    Syncope ICD-10-CM: R55  ICD-9-CM: 780.2  8/2/2021 - Present        Fibula fracture ICD-10-CM: S82.409A  ICD-9-CM: 823.81  8/2/2021 - Present        History of CVA (cerebrovascular accident) ICD-10-CM: Z86.73  ICD-9-CM: V12.54  8/2/2021 - Present        Hypertension ICD-10-CM: I10  ICD-9-CM: 401.9  8/2/2021 - Present        RESOLVED: Right bundle branch block ICD-10-CM: I45.10  ICD-9-CM: 426.4  8/2/2021 - 8/2/2021              Greater than 35 minutes were spent with the patient on counseling and coordination of care    Signed:   Savanna Dotson MD  8/3/2021  1:18 PM

## 2021-08-03 NOTE — PROGRESS NOTES
conducted an initial consultation and Spiritual Assessment for Margarito Simeon, who is a 78 y. o.,male. Patients Primary Language is: [place]. According to the patients EMR Orthodox Affiliation is: No preference. The reason the Patient came to the hospital is:   Patient Active Problem List    Diagnosis Date Noted    Syncope 08/02/2021    Fibula fracture 08/02/2021    History of CVA (cerebrovascular accident) 08/02/2021    Hypertension 08/02/2021        The  provided the following Interventions:  Initiated a relationship of care and support. Explored issues of vibha, spirituality and/or Worship needs while hospitalized. Listened empathically. Provided chaplaincy education. Provided information about Spiritual Care Services. Offered prayer and assurance of continued prayers on patient's behalf. Chart reviewed. The following outcomes were achieved:  Patient shared some information about their medical narrative and spiritual journey/beliefs. Patient processed feeling about current hospitalization. Patient expressed gratitude for the 's visit. Assessment:  Patient did not indicate any spiritual or Worship issues which require Spiritual Care Services interventions at this time. Patient does not have any Worship/cultural needs that will affect patients preferences in health care. Plan:  Chaplains will continue to follow and will provide pastoral care on an as needed or requested basis.  recommends bedside caregivers page  on duty if patient shows signs of acute spiritual or emotional distress. Per patient, feeling much better since admission. Grateful for the care of staff. Normalization of emotions.  offered availability and provided support.      [facility]   Staff [address] [facility]   [phone]

## 2021-08-03 NOTE — PROGRESS NOTES
Nurse called Dr. Chester Washington office 5x with busy signal. Patient given discharge orders, verbalized understanding. IV and tele d/c'd at this time.

## 2021-08-03 NOTE — PROGRESS NOTES
Encouraged pt to attempt to use the bathroom as he has a test scheduled for 0700, pt denies needing to go at this time. Fresh ice water at bedside.  CBWR

## 2021-08-03 NOTE — PROGRESS NOTES
Assessment completed. Pt resting comfortably in bed, denies any pain at this time. States pain will increase to 8/10 with movement only. Pedal pulse in right foot palpable +2, some swelling noted. Pt denies any needs at this time. Will continue to monitor.  CBWR

## 2021-08-03 NOTE — PROGRESS NOTES
Problem: Mobility Impaired (Adult and Pediatric)  Goal: *Acute Goals and Plan of Care (Insert Text)  Description: Physical Therapy Goals  Initiated 8/3/2021 and to be accomplished within 7 day(s)  1. Patient will move from supine to sit and sit to supine , scoot up and down, and roll side to side in bed with modified independence. 2.  Patient will transfer from bed to chair and chair to bed with modified independence using the least restrictive device. 3.  Patient will perform sit to stand with modified independence. 4.  Patient will ambulate with modified independence for 100 feet with the least restrictive device. 5.  Patient will ascend/descend 4 stairs with 2 handrail(s) with minimal assistance/contact guard assist.    PLOF: Community ambulator who did not use an AD and who was (I) with ADLs. Outcome: Progressing Towards Goal   PHYSICAL THERAPY EVALUATION    Patient: Tirso Pritchard (39 y.o. male)  Date: 8/3/2021  Primary Diagnosis: Syncope [R55]  Fibula fracture [S82.409A]  Right bundle branch block [I45.10]        Precautions:   NWB    ASSESSMENT :  Based on the objective data described below, the patient presents with syncopal event resulting in a distal R fibular fx and he is NWB. He performs all mobility very well and states he will be fine going up his stairs at home. Patient would benefit from further P.T. to improve strength, gait, and stair navigation. They would likely benefit from outpatient P.T. once allowed. Patient will benefit from skilled intervention to address the above impairments.   Patient's rehabilitation potential is considered to be Excellent  Factors which may influence rehabilitation potential include:   []         None noted  []         Mental ability/status  [x]         Medical condition  []         Home/family situation and support systems  []         Safety awareness  []         Pain tolerance/management  []         Other:      PLAN :  Recommendations and Planned Interventions:   [x]           Bed Mobility Training             []    Neuromuscular Re-Education  [x]           Transfer Training                   []    Orthotic/Prosthetic Training  [x]           Gait Training                          []    Modalities  [x]           Therapeutic Exercises           []    Edema Management/Control  [x]           Therapeutic Activities            []    Family Training/Education  [x]           Patient Education  []           Other (comment):    Frequency/Duration: Patient will be followed by physical therapy 1-2 times per day/4-7 days per week to address goals. Discharge Recommendations: Outpatient  Further Equipment Recommendations for Discharge: N/A     SUBJECTIVE:   Patient stated it doesn't hurt at rest.    OBJECTIVE DATA SUMMARY:     Past Medical History:   Diagnosis Date    Hypertension     Stroke [facility])    History reviewed. No pertinent surgical history.   Barriers to Learning/Limitations: None  Compensate with: N/A  Home Situation:  Home Situation  Home Environment: Private residence  # Steps to Enter: 4  Rails to Enter: Yes  Hand Rails : Bilateral  One/Two Story Residence: Two story, live on 1st floor  # of Interior Steps: 10  Interior Rails: Both  Living Alone: No  Support Systems: Family member(s)  Patient Expects to be Discharged to[de-identified] [employer]  Current DME Used/Available at Home: Walker, rolling  Critical Behavior:  Neurologic State: Alert  Orientation Level: Oriented X4  Psychosocial  Purposeful Interaction: Yes  Pt Identified Daily Priority: Clinical issues (comment)  Strength:    Strength: Generally decreased, functional  RUE: 5/5  LUE: 5/5  RLE: 4+/5, DNT ankle  LLE: 5/5  Tone & Sensation:   Sensation: Intact  Range Of Motion:   AROM: Generally decreased, functional (R ankle in a soft cast)  RLE: DNT ankle motion due to cast  Posture:  Posture (WDL): Within defined limits  Functional Mobility:  Bed Mobility:  Rolling: Modified independent  Supine to Sit: Modified independent  Sit to Supine: Modified independent  Scooting: Modified independent  Transfers:  Sit to Stand: Modified independent  Stand to Sit: Modified independent  Balance:   Sitting: Intact  Standing: Intact  Ambulation/Gait Training:  Distance (ft): 40 Feet (ft)  Assistive Device: Walker, rolling  Ambulation - Level of Assistance: Modified independent  Gait Description (WDL): Exceptions to WDL  Right Side Weight Bearing: Non-weight bearing    Pain:  Pain level pre-treatment: 0/10   Pain level post-treatment: 0/10   Pain Location: Up to 10 at times in the R ankle  Activity Tolerance:   Good  Please refer to the flowsheet for vital signs taken during this treatment. After treatment:   []         Patient left in no apparent distress sitting up in chair  [x]         Patient left in no apparent distress in bed  [x]         Call bell left within reach  [x]         Nursing notified  []         Caregiver present  []         Bed alarm activated  []         SCDs applied    COMMUNICATION/EDUCATION:   [x]         Role of Physical Therapy in the acute care setting. []         Fall prevention education was provided and the patient/caregiver indicated understanding. [x]         Patient/family have participated as able in goal setting and plan of care. [x]         Patient/family agree to work toward stated goals and plan of care. []         Patient understands intent and goals of therapy, but is neutral about his/her participation. []         Patient is unable to participate in goal setting/plan of care: ongoing with therapy staff.  []         Other:     Thank you for this referral.  Tania Poole, PT, DPT   Time Calculation: 15 mins

## 2021-08-03 NOTE — PROGRESS NOTES
Pt up to bathroom and back to bed. Pt c/o 8/10 right foot pain, prn norco given. CIWA score 0. Vascular check to right foot WDL. Pt denies any further needs at this time.  CBWR

## 2021-08-03 NOTE — PROGRESS NOTES
HS medication given. NS started per orders. (verified with hospitalist as pt has good PO intake and sodium of 134.) New 20G PIV placed in LFA for comfort, 22G removed from RAC. Pt denies any further needs at this time. Will continue to monitor.  CBWR

## 2021-08-03 NOTE — PROGRESS NOTES
Assumed care of patient during bedside shift report. Patient laying in bed awake, alert and oriented. Denies any needs. IVF infusing as ordered. Patient updated on ECHO status and verbalized understanding. Will continue to monitor.

## 2021-08-03 NOTE — PROGRESS NOTES
Reason for Admission: Chart reviewed and noted patient presented with C/O right ankle pain. Patient's wife states pt was sitting on the back of a pickup truck and all of a sudden slumped over and fell out the back of the pickup truck and remained unconscious for 3 minutes. He refused to come to the hospital at that time. The next morning when he tried to get up out of bed he was unable to bear weight on that right leg due to pain. He than decided to go to the ER. Dx: Fibula Fracture    PMH: HTN, HLD, CVA                       RUR Score: NA                    Plan for utilizing home health: TBD         PCP: First and Last name:  Unknown, Provider, MD- Patient will need to be provided with a PCP prior to discharge. Name of Practice:    Are you a current patient: Yes/No:    Approximate date of last visit:    Can you participate in a virtual visit with your PCP:                     Current Advanced Directive/Advance Care Plan: Full Code- No ACP      Healthcare Decision Maker: Jennifer Bliss  Click here to complete [address] including selection of the Healthcare Decision Maker Relationship (ie \"Primary\")                             Transition of Care Plan: TBD    Care Management Interventions  PCP Verified by CM: Patient will need to be provided with a PCP prior to discharge. Transition of Care Consult (CM Consult):  Discharge Planning  Current Support Network: Wife- Diallo Mosher- [phone]. Patient lives at home with his wife and plans to go back home at discharge.

## 2021-08-03 NOTE — PROGRESS NOTES
Orthostatic BP's completed and are as follows:    Lying 122/81 HR 73    Sitting 121/71 HR 83    Standing 115/68 HR 94    CIWA score 0. Vascular check completed. Pedal pulse in right foot palaple +2, skin is warm. Dressing is clean dry and intact. Cap refill is <3 seconds. IVF infusing without issue. Pt denies any needs at this time.  CBWR

## 2021-08-04 NOTE — PROGRESS NOTES
OCCUPATIONAL THERAPY EVALUATION    Patient: Jennine Gowers (48 y.o. male)  Date: 8/4/2021  Primary Diagnosis: Syncope [R55]  Fibula fracture [S82.409A]  Right bundle branch block [I45.10]       Precautions:   NWB  PLOF: Lives at home with wife and had been I with all ADLs and mobility     ASSESSMENT :  Based on the objective data described below, the patient presents with declines in ADLs and mobility . Patient will benefit from skilled intervention to address the above impairments. Patient's rehabilitation potential is considered to be Good  Factors which may influence rehabilitation potential include:   []             None noted  []             Mental ability/status  []             Medical condition  [x]             Home/family situation and support systems  [x]             Safety awareness  []             Pain tolerance/management  []             Other:      PLAN :  Recommendations and Planned Interventions:  [x]               Self Care Training                  [x]      Therapeutic Activities  [x]               Functional Mobility Training   []      Cognitive Retraining  [x]               Therapeutic Exercises           []      Endurance Activities  []               Balance Training                    []      Neuromuscular Re-Education  []               Visual/Perceptual Training     [x]      Home Safety Training  [x]               Patient Education                   [x]      Family Training/Education  []               Other (comment):    Frequency/Duration: Patient will be followed by occupational therapy 3 times a week to address goals. Discharge Recommendations: To Be Determined  Further Equipment Recommendations for Discharge: walker and N/A     SUBJECTIVE:   Patient stated Im ready to go home.     OBJECTIVE DATA SUMMARY:     Past Medical History:   Diagnosis Date    Hypertension     Stroke [facility])    History reviewed. No pertinent surgical history.   Barriers to Learning/Limitations: None  Compensate with: visual, verbal, tactile, kinesthetic cues/model    Home Situation:   Home Situation  Home Environment: Private residence  # Steps to Enter: 4  Rails to Enter: Yes  Hand Rails : Bilateral  One/Two Story Residence: Two story, live on 1st floor  # of Interior Steps: 10  Interior Rails: Both  Living Alone: No  Support Systems: Family member(s)  Patient Expects to be Discharged to[de-identified] [employer]  Current DME Used/Available at Home: [place] [employer], rolling  [x]  Right hand dominant   []  Left hand dominant    Cognitive/Behavioral Status:    A&O x 4; follows multi step commands              Skin:intact  Edema: intact  Vision/Perceptual:       WFLs                               Coordination: BUE   WFLs             Balance:   normal     Strength: BUE  4/5                    Tone & Sensation: BUE  intact          Range of Motion: BUE  Full                             Functional Mobility and Transfers for ADLs:  Bed Mobility:   mod I            Transfers:   mod I                                     ADL Assessment:     supervision for basic ADLs, some difficulty with mobility due to NWB status. Wife reports feeling comfortable with helping pt in home            Pain:  Pain level pre-treatment: 3/10   Pain level post-treatment: 3/10   Pain Intervention(s): Medication (see MAR); Rest, Ice, Repositioning  Response to intervention: Nurse notified, See doc flow    Activity Tolerance:   Good     Please refer to the flowsheet for vital signs taken during this treatment. After treatment:   [] Patient left in no apparent distress sitting up in chair  [x] Patient left in no apparent distress in bed  [x] Call bell left within reach  [] Nursing notified  [] Caregiver present  [] Bed alarm activated    COMMUNICATION/EDUCATION:   [x] Role of Occupational Therapy in the acute care setting  [x] Home safety education was provided and the patient/caregiver indicated understanding.   [] Patient/family have participated as able in goal setting and plan of care.  [] Patient/family agree to work toward stated goals and plan of care. [] Patient understands intent and goals of therapy, but is neutral about his/her participation. [] Patient is unable to participate in goal setting and plan of care. Thank you for this referral.  Roxane Garcia MS, OTR/L        Sophie Complexity: History: MEDIUM Complexity : Expanded review of history including physical, cognitive and psychosocial  history ; Examination: MEDIUM Complexity : 3-5 performance deficits relating to physical, cognitive , or psychosocial skils that result in activity limitations and / or participation restrictions; Decision Making:MEDIUM Complexity : Patient may present with comorbidities that affect occupational performnce.  Miniml to moderate modification of tasks or assistance (eg, physical or verbal ) with assesment(s) is necessary to enable patient to complete evaluation

## 2021-08-16 ENCOUNTER — OFFICE VISIT (OUTPATIENT)
Dept: ORTHOPEDIC SURGERY | Age: 71
End: 2021-08-16
Payer: MEDICARE

## 2021-08-16 VITALS — WEIGHT: 160 LBS | HEIGHT: 72 IN | BODY MASS INDEX: 21.67 KG/M2

## 2021-08-16 DIAGNOSIS — M25.471 SWOLLEN ANKLES: Primary | ICD-10-CM

## 2021-08-16 DIAGNOSIS — M25.472 SWOLLEN ANKLES: Primary | ICD-10-CM

## 2021-08-16 DIAGNOSIS — S82.64XA CLOSED NONDISPLACED FRACTURE OF LATERAL MALLEOLUS OF RIGHT FIBULA, INITIAL ENCOUNTER: ICD-10-CM

## 2021-08-16 PROCEDURE — 1101F PT FALLS ASSESS-DOCD LE1/YR: CPT | Performed by: ORTHOPAEDIC SURGERY

## 2021-08-16 PROCEDURE — G8427 DOCREV CUR MEDS BY ELIG CLIN: HCPCS | Performed by: ORTHOPAEDIC SURGERY

## 2021-08-16 PROCEDURE — G8420 CALC BMI NORM PARAMETERS: HCPCS | Performed by: ORTHOPAEDIC SURGERY

## 2021-08-16 PROCEDURE — 99024 POSTOP FOLLOW-UP VISIT: CPT | Performed by: ORTHOPAEDIC SURGERY

## 2021-08-16 PROCEDURE — 3017F COLORECTAL CA SCREEN DOC REV: CPT | Performed by: ORTHOPAEDIC SURGERY

## 2021-08-16 PROCEDURE — L4360 PNEUMAT WALKING BOOT PRE CST: HCPCS | Performed by: ORTHOPAEDIC SURGERY

## 2021-08-16 PROCEDURE — G8536 NO DOC ELDER MAL SCRN: HCPCS | Performed by: ORTHOPAEDIC SURGERY

## 2021-08-16 PROCEDURE — G8510 SCR DEP NEG, NO PLAN REQD: HCPCS | Performed by: ORTHOPAEDIC SURGERY

## 2021-08-16 RX ORDER — OXYCODONE AND ACETAMINOPHEN 5; 325 MG/1; MG/1
1 TABLET ORAL
Qty: 30 TABLET | Refills: 0 | Status: SHIPPED | OUTPATIENT
Start: 2021-08-16 | End: 2021-08-30

## 2021-08-16 NOTE — LETTER
RX/DWO/POD  DOS: 8/16/2021  Charbel Bliss          1950   WIR#[id number]                        Zackery Stoddard                                                                                           NPI# [id number]  Wrist                     Foot/Ankle                         Knee                Wrist Splint            Cam Boot                         Knee Immobilizer    Thumb Spica         Lace Up Ankle Strap       J Sleeve                                  Night Time Splint             T- Scope ROM Brace          Short Runner                                                                           OA Brace   SHOULDER    Sling    Sling w/ Abduction Pillow    ELBOW    Elbow T-Scope ROM Brace    Back    Back Brace    CRUTCHES    Left    Right    __________ Size              IDC 10 Code:____________    I hereby acknowledge receipt of the above listed equipment. I acknowledge that the equipment is in good working order. I have received instructions on safe and proper use of the equipment, including cleaning and maintenance requirements, to my complete satisfaction. I also understand that this product is not able to be returned and is non refundable. I hereby request that payment of authorized Medicare/ third party insurance benefits be made on my behalf of [address] [place] [address] for assigned claims for any authorized equipment/product furnished by [facility].  Having read the foregoing terms and conditions of the agreement on this page , I do hereby agree to be bound thereby.       _______________________________________         ____________________________  Patient/Legal Pricila Pereira            Representative Name

## 2021-08-16 NOTE — PROGRESS NOTES
Name: Jailene     : 1950     Service Dept: [address] [place] [address] and Sports Medicine    Patient's Pharmacies:    [facility] [address], [address] [place]  [address] [facility]  [place] [address]  Phone: [phone] Fax: [phone]       Chief Complaint   Patient presents with    Ankle Pain        Visit Vitals  Ht 6' (1.829 m)   Wt 160 lb (72.6 kg)   BMI 21.70 kg/m²      No Known Allergies   Current Outpatient Medications   Medication Sig Dispense Refill    aspirin (ASPIRIN) 325 mg tablet Take 1 Tablet by mouth two (2) times a day for 42 days. 60 Tablet 1    hydroCHLOROthiazide (HYDRODIURIL) 25 mg tablet Take 25 mg by mouth daily.  pitavastatin calcium (Livalo) 2 mg tablet Take 2 mg by mouth daily.  lisinopriL (PRINIVIL, ZESTRIL) 10 mg tablet Take 10 mg by mouth daily. Patient Active Problem List   Diagnosis Code    Syncope R55    Fibula fracture S82.409A    History of CVA (cerebrovascular accident) Z80.78    Hypertension I10      Family History   Problem Relation Age of Onset    No Known Problems Mother     No Known Problems Father       Social History     Socioeconomic History    Marital status:      Spouse name: Not on file    Number of children: Not on file    Years of education: Not on file    Highest education level: Not on file   Tobacco Use    Smoking status: Current Every Day Smoker     Packs/day: 0.50     Years: 10.00     Pack years: 5.00    Smokeless tobacco: Never Used   Vaping Use    Vaping Use: Never used   Substance and Sexual Activity    Alcohol use:  Yes     Alcohol/week: 30.0 standard drinks     Types: 30 Cans of beer per week    Drug use: Never    Sexual activity: Not Currently     Social Determinants of Health     Financial Resource Strain:     Difficulty of Paying Living Expenses:    Food Insecurity:     Worried About Running Out of Food in the Last Year:     [address] in the Last Year: Transportation Needs:     Lack of Transportation (Medical):  Lack of Transportation (Non-Medical):    Physical Activity:     Days of Exercise per Week:     Minutes of Exercise per Session:    Stress:     Feeling of Stress :    Social Connections:     Frequency of Communication with Friends and Family:     Frequency of Social Gatherings with Friends and Family:     Attends Buddhism Services:     Active Member of Clubs or Organizations:     Attends Club or Organization Meetings:     Marital Status:       History reviewed. No pertinent surgical history. Past Medical History:   Diagnosis Date    Hypertension     Stroke [facility])         I have reviewed and agree with [address] [place] [address] Nw and ROS and intake form in chart and the record furthermore I have reviewed prior medical record(s) regarding this patients care during this appointment. Review of Systems:   Patient is a pleasant appearing individual, appropriately dressed, well hydrated, well nourished, who is alert, appropriately oriented for age, and in no acute distress with a normal gait and normal affect who does not appear to be in any significant pain. Physical Exam:  Right Ankle-Point tenderness to palpation lateral aspect on ankle, Decreased range of motion with flexion and extension, No gross instability, Weakness with plantar flexion, No skin abrasions, Positive for swelling, Grossly neurovascularly intact. Left Ankle- No point tenderness, Full range of motion, No instability, No Weakness, No, skin lesions, No swelling, Grossly neurovascularly intact. Please note that a DME was provided from our office and fitted to an appropriate size. DME provided will help decrease soft tissue swelling, assist with stabilization, and decrease pain with immobilization. Encounter Diagnoses     ICD-10-CM ICD-9-CM   1. Swollen ankles  M25.471 729.81    M25.472    2.  Closed nondisplaced fracture of lateral malleolus of right fibula, initial encounter  S82.64XA 824.2       HPI:  The patient is here with a chief complaint of right ankle pain, dull, throbbing pain. It has been the same. X-rays are positive for right distal fibular fracture, and new repeat x-rays do show little bit of a talar shift compared to before. Pain is 8/10. Assessment/Plan:  Plan at this point, I did talk to him about surgical versus nonsurgical options, and he would like to go with the nonsurgical route. So, plan would be for Cam boot and progressive partial weightbearing. We will see the patient back in 3 to 4 weeks to repeat x-rays of the right ankle, AP, lateral and mortise. At that point, put him into formal therapy program, and he will be weightbearing as tolerated at that point with the Cam boot, and then my nurse practitioner will give him no restrictions, approximately 2-1/2 months. We will give him a refill on pain medication today as well and go from there. As part of continued conservative pain management options the patient was advised to utilize Tylenol or OTC NSAIDS as long as it is not medically contraindicated. Return to Office: Follow-up and Dispositions    · Return in about 4 weeks (around 9/13/2021) for w/ mikayla Sunshine/ eun. Scribed by Kasi Souza LPN as dictated by [facility] Soddy Daisy JERRY Holley MD.  Documentation True and Accepted Zackery Holley MD

## 2021-08-16 NOTE — PATIENT INSTRUCTIONS
Ankle: Exercises  Introduction  Here are some examples of exercises for you to try. The exercises may be suggested for a condition or for rehabilitation. Start each exercise slowly. Ease off the exercises if you start to have pain. You will be told when to start these exercises and which ones will work best for you. How to do the exercises  'Alphabet' exercise   1. Trace the alphabet with your toe. This helps your ankle move in all directions. Side-to-side knee swing exercise   1. Sit in a chair with your foot flat on the floor. 2. Slowly move your knee from side to side while keeping your foot pressed flat. 3. Continue this exercise for 2 to 3 minutes. Towel curl   1. While sitting, place your foot on a towel on the floor and scrunch the towel toward you with your toes. 2. Then use your toes to push the towel away from you. 3. Make this exercise more challenging by placing a weighted object, such as a soup can, on the other end of the towel. Towel stretch   1. Sit with your legs extended and knees straight. 2. Place a towel around your foot just under the toes. 3. Hold each end of the towel in each hand, with your hands above your knees. 4. Pull back with the towel so that your foot stretches toward you. 5. Hold the position for at least 15 to 30 seconds. 6. Repeat 2 to 4 times a session, up to 5 sessions a day. Ankle eversion exercise   1. Start by sitting with your foot flat on the floor and pushing it outward against an immovable object such as the wall or heavy furniture. Hold for about 6 seconds, then relax. Repeat 8 to 12 times. 2. After you feel comfortable with this, try using rubber tubing looped around the outside of your feet for resistance. Push your foot out to the side against the tubing, and then count to 10 as you slowly bring your foot back to the middle. Repeat 8 to 12 times. Isometric opposition exercises   1.  While sitting, put your feet together flat on the floor.  2. Press your injured foot inward against your other foot. Hold for about 6 seconds, and relax. Repeat 8 to 12 times. 3. Then place the heel of your other foot on top of the injured one. Push down with the top heel while trying to push up with your injured foot. Hold for about 6 seconds, and relax. Repeat 8 to 12 times. Follow-up care is a key part of your treatment and safety. Be sure to make and go to all appointments, and call your doctor if you are having problems. It's also a good idea to know your test results and keep a list of the medicines you take. Where can you learn more? Go to http://www.gray.com/  Enter R730 in the search box to learn more about \"Ankle: Exercises. \"  Current as of: November 16, 2020               Content Version: 12.8  © 1133-3416 [employer], [employer]. Care instructions adapted under license by [facility] (which disclaims liability or warranty for this information). If you have questions about a medical condition or this instruction, always ask your healthcare professional. [address] any warranty or liability for your use of this information.

## 2022-03-18 PROBLEM — I10 HYPERTENSION: Status: ACTIVE | Noted: 2021-08-02

## 2022-03-18 PROBLEM — Z86.73 HISTORY OF CVA (CEREBROVASCULAR ACCIDENT): Status: ACTIVE | Noted: 2021-08-02

## 2022-03-19 PROBLEM — R55 SYNCOPE: Status: ACTIVE | Noted: 2021-08-02

## 2022-03-19 PROBLEM — S82.409A FIBULA FRACTURE: Status: ACTIVE | Noted: 2021-08-02

## 2025-05-02 ENCOUNTER — HOSPITAL ENCOUNTER (EMERGENCY)
Age: 75
Discharge: HOME | End: 2025-05-02
Attending: FAMILY MEDICINE
Payer: MEDICARE

## 2025-05-02 VITALS
TEMPERATURE: 98.2 F | HEART RATE: 76 BPM | HEIGHT: 72 IN | DIASTOLIC BLOOD PRESSURE: 67 MMHG | OXYGEN SATURATION: 99 % | BODY MASS INDEX: 24.38 KG/M2 | RESPIRATION RATE: 16 BRPM | SYSTOLIC BLOOD PRESSURE: 123 MMHG | WEIGHT: 180 LBS

## 2025-05-02 DIAGNOSIS — T67.5XXA HEAT EXHAUSTION, INITIAL ENCOUNTER: Primary | ICD-10-CM

## 2025-05-02 LAB
ALBUMIN SERPL-MCNC: 3.5 G/DL (ref 3.4–5)
ALBUMIN/GLOB SERPL: 1 (ref 0.8–1.7)
ALP SERPL-CCNC: 103 U/L (ref 45–117)
ALT SERPL-CCNC: 28 U/L (ref 16–61)
ANION GAP SERPL CALC-SCNC: 8 MMOL/L (ref 3–18)
AST SERPL W P-5'-P-CCNC: 23 U/L (ref 10–38)
BASOPHILS # BLD: 0.07 K/UL (ref 0–0.1)
BASOPHILS NFR BLD: 0.6 % (ref 0–2)
BILIRUB SERPL-MCNC: 0.4 MG/DL (ref 0.2–1)
BUN SERPL-MCNC: 9 MG/DL (ref 7–18)
BUN/CREAT SERPL: 8 (ref 12–20)
CA-I BLD-MCNC: 8.9 MG/DL (ref 8.5–10.1)
CHLORIDE SERPL-SCNC: 107 MMOL/L (ref 100–111)
CK SERPL-CCNC: 131 U/L (ref 39–308)
CO2 SERPL-SCNC: 25 MMOL/L (ref 21–32)
CREAT SERPL-MCNC: 1.19 MG/DL (ref 0.6–1.3)
DIFFERENTIAL METHOD BLD: ABNORMAL
EOSINOPHIL # BLD: 0.24 K/UL (ref 0–0.4)
EOSINOPHIL NFR BLD: 2 % (ref 0–5)
ERYTHROCYTE [DISTWIDTH] IN BLOOD BY AUTOMATED COUNT: 13.7 % (ref 11.6–14.5)
GLOBULIN SER CALC-MCNC: 3.6 G/DL (ref 2–4)
GLUCOSE SERPL-MCNC: 138 MG/DL (ref 74–99)
HCT VFR BLD AUTO: 41.6 % (ref 36–48)
HGB BLD-MCNC: 14.2 G/DL (ref 13–16)
IMM GRANULOCYTES # BLD AUTO: 0.06 K/UL (ref 0–0.04)
IMM GRANULOCYTES NFR BLD AUTO: 0.5 % (ref 0–0.5)
LYMPHOCYTES # BLD: 1.42 K/UL (ref 0.9–3.6)
LYMPHOCYTES NFR BLD: 11.7 % (ref 21–52)
MCH RBC QN AUTO: 31 PG (ref 24–34)
MCHC RBC AUTO-ENTMCNC: 34.1 G/DL (ref 31–37)
MCV RBC AUTO: 90.8 FL (ref 78–100)
MONOCYTES # BLD: 0.78 K/UL (ref 0.05–1.2)
MONOCYTES NFR BLD: 6.4 % (ref 3–10)
NEUTS SEG # BLD: 9.57 K/UL (ref 1.8–8)
NEUTS SEG NFR BLD: 78.8 % (ref 40–73)
NRBC # BLD: 0 K/UL (ref 0–0.01)
NRBC BLD-RTO: 0 PER 100 WBC
PLATELET # BLD AUTO: 176 K/UL (ref 135–420)
PMV BLD AUTO: 11.6 FL (ref 9.2–11.8)
POTASSIUM SERPL-SCNC: 3.4 MMOL/L (ref 3.5–5.5)
PROT SERPL-MCNC: 7.1 G/DL (ref 6.4–8.2)
RBC # BLD AUTO: 4.58 M/UL (ref 4.35–5.65)
SODIUM SERPL-SCNC: 140 MMOL/L (ref 136–145)
WBC # BLD AUTO: 12.1 K/UL (ref 4.6–13.2)

## 2025-05-02 PROCEDURE — 82550 ASSAY OF CK (CPK): CPT

## 2025-05-02 PROCEDURE — 99283 EMERGENCY DEPT VISIT LOW MDM: CPT

## 2025-05-02 PROCEDURE — 80053 COMPREHEN METABOLIC PANEL: CPT

## 2025-05-02 PROCEDURE — 85025 COMPLETE CBC W/AUTO DIFF WBC: CPT

## 2025-05-02 ASSESSMENT — LIFESTYLE VARIABLES
HOW MANY STANDARD DRINKS CONTAINING ALCOHOL DO YOU HAVE ON A TYPICAL DAY: 5 OR 6
HOW OFTEN DO YOU HAVE A DRINK CONTAINING ALCOHOL: 4 OR MORE TIMES A WEEK

## 2025-05-02 ASSESSMENT — PAIN - FUNCTIONAL ASSESSMENT: PAIN_FUNCTIONAL_ASSESSMENT: 0-10

## 2025-05-02 ASSESSMENT — PAIN SCALES - GENERAL: PAINLEVEL_OUTOF10: 0

## 2025-05-02 NOTE — ED TRIAGE NOTES
Pt reports working outside today in the yard, states he became over heated and passed out, reports he had nothing to eat or drink today. Pt stating he is feeling better at this time and is ready to leave when his ride arrives.

## 2025-05-02 NOTE — ED PROVIDER NOTES
findings:        Interpretation per the Radiologist below, if available at the time of this note:    No orders to display         ED BEDSIDE ULTRASOUND:   Performed by ED Physician - none    LABS:  Labs Reviewed   COMPREHENSIVE METABOLIC PANEL - Abnormal; Notable for the following components:       Result Value    Potassium 3.4 (*)     Glucose 138 (*)     BUN/Creatinine Ratio 8 (*)     All other components within normal limits   CBC WITH AUTO DIFFERENTIAL - Abnormal; Notable for the following components:    Neutrophils % 78.8 (*)     Lymphocytes % 11.7 (*)     Neutrophils Absolute 9.57 (*)     Immature Granulocytes Absolute 0.06 (*)     All other components within normal limits   CK   URINALYSIS       All other labs were within normal range or not returned as of this dictation.    EMERGENCY DEPARTMENT COURSE and DIFFERENTIAL DIAGNOSIS/MDM:   Vitals:    Vitals:    05/02/25 1718   BP: (!) 120/51   Pulse: 76   Resp: 17   Temp: 98.2 °F (36.8 °C)   TempSrc: Oral   SpO2: 98%   Weight: 81.6 kg (180 lb)   Height: 1.829 m (6')         Medical Decision Making  Heat exhaustion, heatstroke, dehydration, syncope, electrolyte abnormalities.  On my differential.  At this point I have a low suspicion he is having any type of stroke he denies any syncope he is aware of person place and time.  Per his wife he is acting appropriate and normal.  He is ready to go home.  Vital signs are stable for discharge recommend he stay out of the sun drink plenty of fluids over the next several days.  Low suspicion for any type of heatstroke at this time as he is alert and orientated CK is appropriate lab work is appropriate,    Amount and/or Complexity of Data Reviewed  Labs: ordered.            REASSESSMENT          CONSULTS:  None    PROCEDURES:  Unless otherwise noted below, none     Procedures        FINAL IMPRESSION      1. Heat exhaustion, initial encounter          DISPOSITION/PLAN   DISPOSITION Decision To Discharge 05/02/2025 06:25:33 PM    DISPOSITION CONDITION Stable           PATIENT REFERRED TO:  Michelle Cox, FNP  [address]  [facility] [address] [phone]    Schedule an appointment as soon as possible for a visit         DISCHARGE MEDICATIONS:  New Prescriptions    No medications on file     Controlled Substances Monitoring:          No data to display                (Please note that portions of this note were completed with a voice recognition program.  Efforts were made to edit the dictations but occasionally words are mis-transcribed.)    Chaka Rosales DO (electronically signed)  Attending Emergency Physician           Chaka Rosales DO  05/02/25 1192

## 2025-05-02 NOTE — DISCHARGE INSTRUCTIONS
As we spoke your lab work looks appropriate at this time.  It is imperative that you stay hydrated over the next several days.  Avoid the sun and heat for the next several days.  Drink plenty of fluids.  I want you to follow-up with your primary care provider if you do not have 1 I did give you the name of nurse practitioner Brooke.  Here in town you can call and schedule an appointment making them aware that you are unassigned emergency room patient and you should be able to be seen sooner rather than later or follow-up with your primary care doctor return if you have any questions or concerns.